# Patient Record
Sex: MALE | NOT HISPANIC OR LATINO | Employment: UNEMPLOYED | URBAN - NONMETROPOLITAN AREA
[De-identification: names, ages, dates, MRNs, and addresses within clinical notes are randomized per-mention and may not be internally consistent; named-entity substitution may affect disease eponyms.]

---

## 2024-03-27 ENCOUNTER — HOSPITAL ENCOUNTER (EMERGENCY)
Facility: HOSPITAL | Age: 44
Discharge: HOME | End: 2024-03-27
Attending: EMERGENCY MEDICINE

## 2024-03-27 VITALS
DIASTOLIC BLOOD PRESSURE: 74 MMHG | HEART RATE: 105 BPM | TEMPERATURE: 98.4 F | SYSTOLIC BLOOD PRESSURE: 138 MMHG | OXYGEN SATURATION: 100 % | RESPIRATION RATE: 18 BRPM

## 2024-03-27 DIAGNOSIS — S61.412A LACERATION OF LEFT HAND WITHOUT FOREIGN BODY, INITIAL ENCOUNTER: Primary | ICD-10-CM

## 2024-03-27 PROCEDURE — 99284 EMERGENCY DEPT VISIT MOD MDM: CPT | Performed by: EMERGENCY MEDICINE

## 2024-03-27 PROCEDURE — 12002 RPR S/N/AX/GEN/TRNK2.6-7.5CM: CPT | Performed by: EMERGENCY MEDICINE

## 2024-03-27 PROCEDURE — 99282 EMERGENCY DEPT VISIT SF MDM: CPT

## 2024-03-27 RX ORDER — CEPHALEXIN 250 MG/1
500 CAPSULE ORAL ONCE
Status: COMPLETED | OUTPATIENT
Start: 2024-03-27 | End: 2024-03-27

## 2024-03-27 RX ORDER — CEPHALEXIN 500 MG/1
500 CAPSULE ORAL EVERY 8 HOURS SCHEDULED
Qty: 15 CAPSULE | Refills: 0 | Status: SHIPPED | OUTPATIENT
Start: 2024-03-27 | End: 2024-04-01

## 2024-03-27 RX ADMIN — CEPHALEXIN 500 MG: 250 CAPSULE ORAL at 15:39

## 2024-03-27 NOTE — ED PROVIDER NOTES
History  Chief Complaint   Patient presents with    Laceration     Patient has laceration on left hand       History provided by:  Medical records and patient   used: Yes (ipad)    Hand Injury  Location:  Hand  Hand location:  L palm  Injury: yes    Time since incident:  20 minutes  Mechanism of injury comment:  Patient was cutting meat with a knife, slipped and cut his left hand  Pain details:     Quality:  Aching and dull    Radiates to:  Does not radiate    Severity:  Mild    Onset quality:  Sudden    Duration: 20 minutes.    Timing:  Constant    Progression:  Unchanged  Handedness:  Left-handed  Foreign body present:  No foreign bodies  Tetanus status:  Up to date  Prior injury to area:  No  Relieved by:  Nothing  Worsened by:  Nothing  Ineffective treatments:  None tried  Associated symptoms: no back pain, no decreased range of motion, no fatigue, no fever, no muscle weakness, no neck pain, no numbness, no stiffness, no swelling and no tingling    Risk factors: no concern for non-accidental trauma        None       History reviewed. No pertinent past medical history.    History reviewed. No pertinent surgical history.    History reviewed. No pertinent family history.  I have reviewed and agree with the history as documented.    E-Cigarette/Vaping    E-Cigarette Use Never User      E-Cigarette/Vaping Substances     Social History     Tobacco Use    Smoking status: Every Day     Types: Cigarettes     Passive exposure: Never    Smokeless tobacco: Never   Vaping Use    Vaping status: Never Used   Substance Use Topics    Alcohol use: Yes    Drug use: Not Currently       Review of Systems   Constitutional:  Negative for appetite change, chills, fatigue and fever.   HENT:  Negative for ear pain, rhinorrhea, sore throat and trouble swallowing.    Eyes:  Negative for pain, discharge and visual disturbance.   Respiratory:  Negative for cough, chest tightness and shortness of breath.    Cardiovascular:   Negative for chest pain and palpitations.   Gastrointestinal:  Negative for abdominal pain, nausea and vomiting.   Endocrine: Negative for polydipsia, polyphagia and polyuria.   Genitourinary:  Negative for difficulty urinating, dysuria, hematuria and testicular pain.   Musculoskeletal:  Negative for arthralgias, back pain, neck pain and stiffness.   Skin:  Positive for wound. Negative for color change and rash.   Allergic/Immunologic: Negative for immunocompromised state.   Neurological:  Negative for dizziness, seizures, syncope, weakness and headaches.   Hematological:  Negative for adenopathy.   Psychiatric/Behavioral:  Negative for confusion and dysphoric mood.    All other systems reviewed and are negative.      Physical Exam  Physical Exam  Vitals and nursing note reviewed.   Constitutional:       General: He is not in acute distress.     Appearance: Normal appearance. He is not ill-appearing, toxic-appearing or diaphoretic.   HENT:      Head: Normocephalic and atraumatic.      Right Ear: External ear normal.      Left Ear: External ear normal.      Nose: Nose normal. No congestion or rhinorrhea.      Mouth/Throat:      Mouth: Mucous membranes are moist.      Pharynx: Oropharynx is clear. No oropharyngeal exudate or posterior oropharyngeal erythema.   Eyes:      General:         Right eye: No discharge.         Left eye: No discharge.   Cardiovascular:      Rate and Rhythm: Normal rate and regular rhythm.      Pulses: Normal pulses.      Heart sounds: Normal heart sounds. No murmur heard.     No gallop.   Pulmonary:      Effort: Pulmonary effort is normal. No respiratory distress.      Breath sounds: Normal breath sounds. No stridor. No wheezing, rhonchi or rales.   Chest:      Chest wall: No tenderness.   Abdominal:      General: Bowel sounds are normal. There is no distension.      Palpations: Abdomen is soft. There is no mass.      Tenderness: There is no abdominal tenderness. There is no right CVA  "tenderness, left CVA tenderness, guarding or rebound.      Hernia: No hernia is present.   Musculoskeletal:         General: Normal range of motion.      Cervical back: Normal range of motion and neck supple.      Comments: The patient has a large laceration approximately 5 cm to the left thenar eminence with exposure of muscle to thenar eminence with small muscle laceration noted, slow bleeding noted.  Patient has full range of motion of his left thumb   Skin:     General: Skin is warm and dry.      Capillary Refill: Capillary refill takes less than 2 seconds.   Neurological:      General: No focal deficit present.      Mental Status: He is alert and oriented to person, place, and time.      Cranial Nerves: No cranial nerve deficit.      Sensory: No sensory deficit.      Motor: No weakness.      Coordination: Coordination normal.      Gait: Gait normal.      Deep Tendon Reflexes: Reflexes normal.   Psychiatric:         Mood and Affect: Mood normal.         Behavior: Behavior normal.         Thought Content: Thought content normal.         Judgment: Judgment normal.         Vital Signs  ED Triage Vitals [03/27/24 1535]   Temperature Pulse Respirations Blood Pressure SpO2   98.4 °F (36.9 °C) 105 18 138/74 100 %      Temp Source Heart Rate Source Patient Position - Orthostatic VS BP Location FiO2 (%)   Temporal Monitor Lying Right arm --      Pain Score       --           Vitals:    03/27/24 1535   BP: 138/74   Pulse: 105   Patient Position - Orthostatic VS: Lying         Visual Acuity      ED Medications  Medications   cephalexin (KEFLEX) capsule 500 mg (500 mg Oral Given 3/27/24 1539)       Diagnostic Studies  Results Reviewed       None                   No orders to display              Procedures  Universal Protocol:  Consent: Verbal consent obtained.  Risks and benefits: risks, benefits and alternatives were discussed  Consent given by: patient  Time out: Immediately prior to procedure a \"time out\" was called to " verify the correct patient, procedure, equipment, support staff and site/side marked as required.  Timeout called at: 3/27/2024 3:45 PM.  Patient identity confirmed: verbally with patient, arm band, provided demographic data and hospital-assigned identification number  Laceration repair    Date/Time: 3/27/2024 3:45 PM    Performed by: Alejandro Gentile MD  Authorized by: Alejandro Gentile MD  Body area: upper extremity  Location details: left hand  Laceration length: 5 cm  Foreign bodies: no foreign bodies  Tendon involvement: none (small muscle laceration noted)  Nerve involvement: none  Vascular damage: no  Anesthesia: local infiltration    Anesthesia:  Local Anesthetic: lidocaine 1% without epinephrine  Anesthetic total: 4 mL    Sedation:  Patient sedated: no        Procedure Details:  Preparation: Patient was prepped and draped in the usual sterile fashion.  Irrigation solution: saline  Irrigation method: syringe  Amount of cleaning: extensive  Debridement: none  Degree of undermining: none  Wound skin closure material used: 2-0 nylon.  Number of sutures: 5  Technique: horizontal mattress  Approximation: close  Approximation difficulty: simple  Dressing: gauze roll  Patient tolerance: patient tolerated the procedure well with no immediate complications               ED Course                                             Medical Decision Making  1529: Patient appears well, vital signs reviewed.  Patient seen a significant laceration to the left thenar eminence.  Patient has full range of motion of the left thumb.  Plan to repair wound primarily, see procedure note for full details..  Patient states his tetanus status is up-to-date.  Due to the degree of injury and muscle involvement, located on the hand, plan to place on antibiotics prophylactically.    Risk  Prescription drug management.             Disposition  Final diagnoses:   Laceration of left hand without foreign body, initial encounter - 5 cm,  complex     Time reflects when diagnosis was documented in both MDM as applicable and the Disposition within this note       Time User Action Codes Description Comment    3/27/2024  4:00 PM Alejandro Gentile Add [S61.412A] Laceration of left hand without foreign body, initial encounter     3/27/2024  4:00 PM Alejandro Gentile Modify [S61.412A] Laceration of left hand without foreign body, initial encounter 5 cm, complex          ED Disposition       ED Disposition   Discharge    Condition   Stable    Date/Time   Wed Mar 27, 2024  4:00 PM    Comment   Mykel Gonzales discharge to home/self care.                   Follow-up Information       Follow up With Specialties Details Why Contact Info    PCP  In 2 weeks For wound re-check, For suture removal             Patient's Medications   Discharge Prescriptions    CEPHALEXIN (KEFLEX) 500 MG CAPSULE    Take 1 capsule (500 mg total) by mouth every 8 (eight) hours for 5 days       Start Date: 3/27/2024 End Date: 4/1/2024       Order Dose: 500 mg       Quantity: 15 capsule    Refills: 0       No discharge procedures on file.    PDMP Review       None            ED Provider  Electronically Signed by             Alejandro Gentile MD  03/27/24 6743

## 2024-03-27 NOTE — Clinical Note
Mykel Gonzales was seen and treated in our emergency department on 3/27/2024.        No work until cleared by Family Doctor/Orthopedics        Diagnosis:     Mykel  may return to work on return date.    He may return on this date: 04/10/2024         If you have any questions or concerns, please don't hesitate to call.      Alejandro Gentile MD    ______________________________           _______________          _______________  Hospital Representative                              Date                                Time

## 2025-06-20 ENCOUNTER — ANESTHESIA EVENT (OUTPATIENT)
Dept: ANESTHESIOLOGY | Facility: HOSPITAL | Age: 45
End: 2025-06-20

## 2025-06-20 ENCOUNTER — APPOINTMENT (EMERGENCY)
Dept: CT IMAGING | Facility: HOSPITAL | Age: 45
End: 2025-06-20
Payer: COMMERCIAL

## 2025-06-20 ENCOUNTER — ANESTHESIA (INPATIENT)
Dept: PERIOP | Facility: HOSPITAL | Age: 45
End: 2025-06-20
Payer: COMMERCIAL

## 2025-06-20 ENCOUNTER — HOSPITAL ENCOUNTER (INPATIENT)
Facility: HOSPITAL | Age: 45
End: 2025-06-20
Attending: EMERGENCY MEDICINE | Admitting: SURGERY
Payer: COMMERCIAL

## 2025-06-20 ENCOUNTER — ANESTHESIA (OUTPATIENT)
Dept: ANESTHESIOLOGY | Facility: HOSPITAL | Age: 45
End: 2025-06-20

## 2025-06-20 ENCOUNTER — ANESTHESIA EVENT (INPATIENT)
Dept: PERIOP | Facility: HOSPITAL | Age: 45
End: 2025-06-20
Payer: COMMERCIAL

## 2025-06-20 DIAGNOSIS — R19.8 PERFORATED VISCUS: Primary | ICD-10-CM

## 2025-06-20 DIAGNOSIS — R19.8 PERFORATION OF VISCUS: ICD-10-CM

## 2025-06-20 PROBLEM — K66.8 PNEUMOPERITONEUM: Status: ACTIVE | Noted: 2025-06-20

## 2025-06-20 LAB
ALBUMIN SERPL BCG-MCNC: 4 G/DL (ref 3.5–5)
ALP SERPL-CCNC: 62 U/L (ref 34–104)
ALT SERPL W P-5'-P-CCNC: 28 U/L (ref 7–52)
ANION GAP SERPL CALCULATED.3IONS-SCNC: 11 MMOL/L (ref 4–13)
AST SERPL W P-5'-P-CCNC: 26 U/L (ref 13–39)
BACTERIA UR QL AUTO: NORMAL /HPF
BASOPHILS # BLD AUTO: 0.14 THOUSANDS/ÂΜL (ref 0–0.1)
BASOPHILS NFR BLD AUTO: 1 % (ref 0–1)
BILIRUB SERPL-MCNC: 1.03 MG/DL (ref 0.2–1)
BILIRUB UR QL STRIP: ABNORMAL
BUN SERPL-MCNC: 13 MG/DL (ref 5–25)
CALCIUM SERPL-MCNC: 9.1 MG/DL (ref 8.4–10.2)
CHLORIDE SERPL-SCNC: 105 MMOL/L (ref 96–108)
CLARITY UR: CLEAR
CO2 SERPL-SCNC: 21 MMOL/L (ref 21–32)
COLOR UR: YELLOW
CREAT SERPL-MCNC: 0.76 MG/DL (ref 0.6–1.3)
EOSINOPHIL # BLD AUTO: 0.06 THOUSAND/ÂΜL (ref 0–0.61)
EOSINOPHIL NFR BLD AUTO: 0 % (ref 0–6)
ERYTHROCYTE [DISTWIDTH] IN BLOOD BY AUTOMATED COUNT: 13 % (ref 11.6–15.1)
GFR SERPL CREATININE-BSD FRML MDRD: 112 ML/MIN/1.73SQ M
GLUCOSE SERPL-MCNC: 122 MG/DL (ref 65–140)
GLUCOSE UR STRIP-MCNC: NEGATIVE MG/DL
HCT VFR BLD AUTO: 55.4 % (ref 36.5–49.3)
HGB BLD-MCNC: 18.1 G/DL (ref 12–17)
HGB UR QL STRIP.AUTO: NEGATIVE
IMM GRANULOCYTES # BLD AUTO: 0.09 THOUSAND/UL (ref 0–0.2)
IMM GRANULOCYTES NFR BLD AUTO: 0 % (ref 0–2)
KETONES UR STRIP-MCNC: NEGATIVE MG/DL
LACTATE SERPL-SCNC: 1.3 MMOL/L (ref 0.5–2)
LEUKOCYTE ESTERASE UR QL STRIP: NEGATIVE
LIPASE SERPL-CCNC: 76 U/L (ref 11–82)
LYMPHOCYTES # BLD AUTO: 1.62 THOUSANDS/ÂΜL (ref 0.6–4.47)
LYMPHOCYTES NFR BLD AUTO: 8 % (ref 14–44)
MCH RBC QN AUTO: 30.5 PG (ref 26.8–34.3)
MCHC RBC AUTO-ENTMCNC: 32.7 G/DL (ref 31.4–37.4)
MCV RBC AUTO: 93 FL (ref 82–98)
MONOCYTES # BLD AUTO: 0.89 THOUSAND/ÂΜL (ref 0.17–1.22)
MONOCYTES NFR BLD AUTO: 4 % (ref 4–12)
NEUTROPHILS # BLD AUTO: 18.93 THOUSANDS/ÂΜL (ref 1.85–7.62)
NEUTS SEG NFR BLD AUTO: 87 % (ref 43–75)
NITRITE UR QL STRIP: NEGATIVE
NON-SQ EPI CELLS URNS QL MICRO: NORMAL /HPF
NRBC BLD AUTO-RTO: 0 /100 WBCS
PH UR STRIP.AUTO: 5.5 [PH]
PLATELET # BLD AUTO: 259 THOUSANDS/UL (ref 149–390)
PLATELET # BLD AUTO: 388 THOUSANDS/UL (ref 149–390)
PMV BLD AUTO: 9.5 FL (ref 8.9–12.7)
PMV BLD AUTO: 9.8 FL (ref 8.9–12.7)
POTASSIUM SERPL-SCNC: 3.8 MMOL/L (ref 3.5–5.3)
PROCALCITONIN SERPL-MCNC: 1.04 NG/ML
PROT SERPL-MCNC: 6.8 G/DL (ref 6.4–8.4)
PROT UR STRIP-MCNC: ABNORMAL MG/DL
RBC # BLD AUTO: 5.93 MILLION/UL (ref 3.88–5.62)
RBC #/AREA URNS AUTO: NORMAL /HPF
SODIUM SERPL-SCNC: 137 MMOL/L (ref 135–147)
SP GR UR STRIP.AUTO: 1.02 (ref 1–1.03)
UROBILINOGEN UR QL STRIP.AUTO: 0.2 E.U./DL
WBC # BLD AUTO: 21.73 THOUSAND/UL (ref 4.31–10.16)
WBC #/AREA URNS AUTO: NORMAL /HPF

## 2025-06-20 PROCEDURE — 80053 COMPREHEN METABOLIC PANEL: CPT | Performed by: EMERGENCY MEDICINE

## 2025-06-20 PROCEDURE — 96365 THER/PROPH/DIAG IV INF INIT: CPT

## 2025-06-20 PROCEDURE — 99284 EMERGENCY DEPT VISIT MOD MDM: CPT

## 2025-06-20 PROCEDURE — 84145 PROCALCITONIN (PCT): CPT | Performed by: EMERGENCY MEDICINE

## 2025-06-20 PROCEDURE — 88341 IMHCHEM/IMCYTCHM EA ADD ANTB: CPT | Performed by: PATHOLOGY

## 2025-06-20 PROCEDURE — 0DQ70ZZ REPAIR STOMACH, PYLORUS, OPEN APPROACH: ICD-10-PCS | Performed by: SURGERY

## 2025-06-20 PROCEDURE — 96375 TX/PRO/DX INJ NEW DRUG ADDON: CPT

## 2025-06-20 PROCEDURE — 99284 EMERGENCY DEPT VISIT MOD MDM: CPT | Performed by: EMERGENCY MEDICINE

## 2025-06-20 PROCEDURE — 96361 HYDRATE IV INFUSION ADD-ON: CPT

## 2025-06-20 PROCEDURE — 87070 CULTURE OTHR SPECIMN AEROBIC: CPT | Performed by: SURGERY

## 2025-06-20 PROCEDURE — 88342 IMHCHEM/IMCYTCHM 1ST ANTB: CPT | Performed by: PATHOLOGY

## 2025-06-20 PROCEDURE — 83605 ASSAY OF LACTIC ACID: CPT | Performed by: EMERGENCY MEDICINE

## 2025-06-20 PROCEDURE — 87205 SMEAR GRAM STAIN: CPT | Performed by: SURGERY

## 2025-06-20 PROCEDURE — 87181 SC STD AGAR DILUTION PER AGT: CPT | Performed by: SURGERY

## 2025-06-20 PROCEDURE — 87077 CULTURE AEROBIC IDENTIFY: CPT | Performed by: SURGERY

## 2025-06-20 PROCEDURE — 74177 CT ABD & PELVIS W/CONTRAST: CPT

## 2025-06-20 PROCEDURE — 87075 CULTR BACTERIA EXCEPT BLOOD: CPT | Performed by: SURGERY

## 2025-06-20 PROCEDURE — 85025 COMPLETE CBC W/AUTO DIFF WBC: CPT | Performed by: EMERGENCY MEDICINE

## 2025-06-20 PROCEDURE — 87040 BLOOD CULTURE FOR BACTERIA: CPT | Performed by: EMERGENCY MEDICINE

## 2025-06-20 PROCEDURE — 81001 URINALYSIS AUTO W/SCOPE: CPT | Performed by: EMERGENCY MEDICINE

## 2025-06-20 PROCEDURE — 83690 ASSAY OF LIPASE: CPT | Performed by: EMERGENCY MEDICINE

## 2025-06-20 PROCEDURE — 88305 TISSUE EXAM BY PATHOLOGIST: CPT | Performed by: PATHOLOGY

## 2025-06-20 PROCEDURE — 85049 AUTOMATED PLATELET COUNT: CPT | Performed by: SURGERY

## 2025-06-20 PROCEDURE — 36415 COLL VENOUS BLD VENIPUNCTURE: CPT | Performed by: EMERGENCY MEDICINE

## 2025-06-20 RX ORDER — PHENYLEPHRINE HCL IN 0.9% NACL 1 MG/10 ML
SYRINGE (ML) INTRAVENOUS AS NEEDED
Status: DISCONTINUED | OUTPATIENT
Start: 2025-06-20 | End: 2025-06-20

## 2025-06-20 RX ORDER — HYDROMORPHONE HCL/PF 1 MG/ML
1 SYRINGE (ML) INJECTION ONCE
Refills: 0 | Status: COMPLETED | OUTPATIENT
Start: 2025-06-20 | End: 2025-06-20

## 2025-06-20 RX ORDER — ACETAMINOPHEN 10 MG/ML
1000 INJECTION, SOLUTION INTRAVENOUS ONCE
Status: COMPLETED | OUTPATIENT
Start: 2025-06-20 | End: 2025-06-20

## 2025-06-20 RX ORDER — DROPERIDOL 2.5 MG/ML
1.25 INJECTION, SOLUTION INTRAMUSCULAR; INTRAVENOUS ONCE
Status: COMPLETED | OUTPATIENT
Start: 2025-06-20 | End: 2025-06-20

## 2025-06-20 RX ORDER — BUPIVACAINE HYDROCHLORIDE 2.5 MG/ML
INJECTION, SOLUTION EPIDURAL; INFILTRATION; INTRACAUDAL; PERINEURAL
Status: COMPLETED | OUTPATIENT
Start: 2025-06-20 | End: 2025-06-20

## 2025-06-20 RX ORDER — ROCURONIUM BROMIDE 10 MG/ML
INJECTION, SOLUTION INTRAVENOUS AS NEEDED
Status: DISCONTINUED | OUTPATIENT
Start: 2025-06-20 | End: 2025-06-20

## 2025-06-20 RX ORDER — PROPOFOL 10 MG/ML
INJECTION, EMULSION INTRAVENOUS AS NEEDED
Status: DISCONTINUED | OUTPATIENT
Start: 2025-06-20 | End: 2025-06-20

## 2025-06-20 RX ORDER — HYDROMORPHONE HCL/PF 1 MG/ML
0.5 SYRINGE (ML) INJECTION
Status: DISCONTINUED | OUTPATIENT
Start: 2025-06-20 | End: 2025-06-20 | Stop reason: HOSPADM

## 2025-06-20 RX ORDER — CEFAZOLIN SODIUM 2 G/50ML
2000 SOLUTION INTRAVENOUS
Status: COMPLETED | OUTPATIENT
Start: 2025-06-20 | End: 2025-06-20

## 2025-06-20 RX ORDER — PANTOPRAZOLE SODIUM 40 MG/10ML
40 INJECTION, POWDER, LYOPHILIZED, FOR SOLUTION INTRAVENOUS EVERY 12 HOURS SCHEDULED
Status: DISCONTINUED | OUTPATIENT
Start: 2025-06-20 | End: 2025-06-27 | Stop reason: HOSPADM

## 2025-06-20 RX ORDER — HYDROMORPHONE HCL/PF 1 MG/ML
0.5 SYRINGE (ML) INJECTION
Refills: 0 | Status: DISCONTINUED | OUTPATIENT
Start: 2025-06-20 | End: 2025-06-27 | Stop reason: HOSPADM

## 2025-06-20 RX ORDER — PROMETHAZINE HYDROCHLORIDE 25 MG/ML
12.5 INJECTION, SOLUTION INTRAMUSCULAR; INTRAVENOUS ONCE
Status: DISCONTINUED | OUTPATIENT
Start: 2025-06-20 | End: 2025-06-20

## 2025-06-20 RX ORDER — ONDANSETRON 2 MG/ML
INJECTION INTRAMUSCULAR; INTRAVENOUS AS NEEDED
Status: DISCONTINUED | OUTPATIENT
Start: 2025-06-20 | End: 2025-06-20

## 2025-06-20 RX ORDER — ACETAMINOPHEN 10 MG/ML
1000 INJECTION, SOLUTION INTRAVENOUS EVERY 6 HOURS PRN
Status: DISCONTINUED | OUTPATIENT
Start: 2025-06-20 | End: 2025-06-27 | Stop reason: HOSPADM

## 2025-06-20 RX ORDER — HYDROMORPHONE HCL/PF 1 MG/ML
0.2 SYRINGE (ML) INJECTION EVERY 4 HOURS PRN
Status: DISCONTINUED | OUTPATIENT
Start: 2025-06-20 | End: 2025-06-27 | Stop reason: HOSPADM

## 2025-06-20 RX ORDER — MIDAZOLAM HYDROCHLORIDE 2 MG/2ML
INJECTION, SOLUTION INTRAMUSCULAR; INTRAVENOUS AS NEEDED
Status: DISCONTINUED | OUTPATIENT
Start: 2025-06-20 | End: 2025-06-20

## 2025-06-20 RX ORDER — DEXAMETHASONE SODIUM PHOSPHATE 10 MG/ML
INJECTION, SOLUTION INTRAMUSCULAR; INTRAVENOUS AS NEEDED
Status: DISCONTINUED | OUTPATIENT
Start: 2025-06-20 | End: 2025-06-20

## 2025-06-20 RX ORDER — SODIUM CHLORIDE, SODIUM LACTATE, POTASSIUM CHLORIDE, CALCIUM CHLORIDE 600; 310; 30; 20 MG/100ML; MG/100ML; MG/100ML; MG/100ML
INJECTION, SOLUTION INTRAVENOUS CONTINUOUS PRN
Status: DISCONTINUED | OUTPATIENT
Start: 2025-06-20 | End: 2025-06-20

## 2025-06-20 RX ORDER — CEFTRIAXONE 2 G/50ML
2000 INJECTION, SOLUTION INTRAVENOUS EVERY 24 HOURS
Status: DISCONTINUED | OUTPATIENT
Start: 2025-06-20 | End: 2025-06-27 | Stop reason: HOSPADM

## 2025-06-20 RX ORDER — ONDANSETRON 2 MG/ML
4 INJECTION INTRAMUSCULAR; INTRAVENOUS EVERY 6 HOURS PRN
Status: DISCONTINUED | OUTPATIENT
Start: 2025-06-20 | End: 2025-06-27 | Stop reason: HOSPADM

## 2025-06-20 RX ORDER — NICOTINE 21 MG/24HR
1 PATCH, TRANSDERMAL 24 HOURS TRANSDERMAL DAILY
Status: DISCONTINUED | OUTPATIENT
Start: 2025-06-20 | End: 2025-06-27 | Stop reason: HOSPADM

## 2025-06-20 RX ORDER — SUCCINYLCHOLINE/SOD CL,ISO/PF 100 MG/5ML
SYRINGE (ML) INTRAVENOUS AS NEEDED
Status: DISCONTINUED | OUTPATIENT
Start: 2025-06-20 | End: 2025-06-20

## 2025-06-20 RX ORDER — PROMETHAZINE HYDROCHLORIDE 25 MG/ML
12.5 INJECTION, SOLUTION INTRAMUSCULAR; INTRAVENOUS ONCE
Status: COMPLETED | OUTPATIENT
Start: 2025-06-20 | End: 2025-06-20

## 2025-06-20 RX ORDER — METRONIDAZOLE 500 MG/100ML
500 INJECTION, SOLUTION INTRAVENOUS EVERY 8 HOURS
Status: DISCONTINUED | OUTPATIENT
Start: 2025-06-20 | End: 2025-06-27 | Stop reason: HOSPADM

## 2025-06-20 RX ORDER — HEPARIN SODIUM 5000 [USP'U]/ML
5000 INJECTION, SOLUTION INTRAVENOUS; SUBCUTANEOUS EVERY 8 HOURS SCHEDULED
Status: DISCONTINUED | OUTPATIENT
Start: 2025-06-21 | End: 2025-06-27 | Stop reason: HOSPADM

## 2025-06-20 RX ORDER — SODIUM CHLORIDE 9 MG/ML
50 INJECTION, SOLUTION INTRAVENOUS CONTINUOUS
Status: DISCONTINUED | OUTPATIENT
Start: 2025-06-20 | End: 2025-06-27 | Stop reason: HOSPADM

## 2025-06-20 RX ORDER — MAGNESIUM HYDROXIDE 1200 MG/15ML
LIQUID ORAL AS NEEDED
Status: DISCONTINUED | OUTPATIENT
Start: 2025-06-20 | End: 2025-06-20 | Stop reason: HOSPADM

## 2025-06-20 RX ORDER — FENTANYL CITRATE 50 UG/ML
INJECTION, SOLUTION INTRAMUSCULAR; INTRAVENOUS AS NEEDED
Status: DISCONTINUED | OUTPATIENT
Start: 2025-06-20 | End: 2025-06-20

## 2025-06-20 RX ADMIN — PANTOPRAZOLE SODIUM 40 MG: 40 INJECTION, POWDER, FOR SOLUTION INTRAVENOUS at 21:59

## 2025-06-20 RX ADMIN — HYDROMORPHONE HYDROCHLORIDE 1 MG: 1 INJECTION, SOLUTION INTRAMUSCULAR; INTRAVENOUS; SUBCUTANEOUS at 09:37

## 2025-06-20 RX ADMIN — PIPERACILLIN AND TAZOBACTAM 4.5 G: 36; 4.5 INJECTION, POWDER, FOR SOLUTION INTRAVENOUS at 10:27

## 2025-06-20 RX ADMIN — ROCURONIUM BROMIDE 20 MG: 10 INJECTION INTRAVENOUS at 13:14

## 2025-06-20 RX ADMIN — Medication 100 MG: at 12:21

## 2025-06-20 RX ADMIN — PROMETHAZINE HYDROCHLORIDE 12.5 MG: 25 INJECTION INTRAMUSCULAR; INTRAVENOUS at 15:58

## 2025-06-20 RX ADMIN — PROPOFOL 160 MG: 10 INJECTION, EMULSION INTRAVENOUS at 12:21

## 2025-06-20 RX ADMIN — DEXAMETHASONE SODIUM PHOSPHATE 10 MG: 10 INJECTION, SOLUTION INTRAMUSCULAR; INTRAVENOUS at 12:24

## 2025-06-20 RX ADMIN — CEFTRIAXONE 2000 MG: 2 INJECTION, SOLUTION INTRAVENOUS at 17:23

## 2025-06-20 RX ADMIN — ROCURONIUM BROMIDE 50 MG: 10 INJECTION INTRAVENOUS at 12:21

## 2025-06-20 RX ADMIN — SODIUM CHLORIDE 1000 ML: 0.9 INJECTION, SOLUTION INTRAVENOUS at 09:22

## 2025-06-20 RX ADMIN — FENTANYL CITRATE 50 MCG: 50 INJECTION INTRAMUSCULAR; INTRAVENOUS at 13:02

## 2025-06-20 RX ADMIN — DROPERIDOL 1.25 MG: 2.5 INJECTION, SOLUTION INTRAMUSCULAR; INTRAVENOUS at 09:25

## 2025-06-20 RX ADMIN — NICOTINE 1 PATCH: 14 PATCH, EXTENDED RELEASE TRANSDERMAL at 17:22

## 2025-06-20 RX ADMIN — IOHEXOL 75 ML: 350 INJECTION, SOLUTION INTRAVENOUS at 09:44

## 2025-06-20 RX ADMIN — ONDANSETRON 4 MG: 2 INJECTION INTRAMUSCULAR; INTRAVENOUS at 13:52

## 2025-06-20 RX ADMIN — SUGAMMADEX 200 MG: 100 INJECTION, SOLUTION INTRAVENOUS at 14:24

## 2025-06-20 RX ADMIN — MORPHINE SULFATE 2 MG: 2 INJECTION, SOLUTION INTRAMUSCULAR; INTRAVENOUS at 09:24

## 2025-06-20 RX ADMIN — FENTANYL CITRATE 50 MCG: 50 INJECTION INTRAMUSCULAR; INTRAVENOUS at 12:21

## 2025-06-20 RX ADMIN — SODIUM CHLORIDE, SODIUM LACTATE, POTASSIUM CHLORIDE, AND CALCIUM CHLORIDE: .6; .31; .03; .02 INJECTION, SOLUTION INTRAVENOUS at 13:27

## 2025-06-20 RX ADMIN — ACETAMINOPHEN 1000 MG: 10 INJECTION, SOLUTION INTRAVENOUS at 13:55

## 2025-06-20 RX ADMIN — BUPIVACAINE HYDROCHLORIDE 60 ML: 2.5 INJECTION, SOLUTION EPIDURAL; INFILTRATION; INTRACAUDAL at 14:27

## 2025-06-20 RX ADMIN — METRONIDAZOLE 500 MG: 500 INJECTION, SOLUTION INTRAVENOUS at 18:23

## 2025-06-20 RX ADMIN — MIDAZOLAM 2 MG: 1 INJECTION INTRAMUSCULAR; INTRAVENOUS at 12:16

## 2025-06-20 RX ADMIN — SODIUM CHLORIDE, SODIUM LACTATE, POTASSIUM CHLORIDE, AND CALCIUM CHLORIDE: .6; .31; .03; .02 INJECTION, SOLUTION INTRAVENOUS at 12:16

## 2025-06-20 RX ADMIN — CEFAZOLIN SODIUM 2000 MG: 2 SOLUTION INTRAVENOUS at 12:16

## 2025-06-20 RX ADMIN — SODIUM CHLORIDE 125 ML/HR: 0.9 INJECTION, SOLUTION INTRAVENOUS at 16:44

## 2025-06-20 RX ADMIN — Medication 100 MCG: at 13:46

## 2025-06-20 NOTE — H&P
H&P -General Surgery  Mykel Gonzales 44 y.o. male MRN: [id number]  Unit/Bed#: ED 09 Encounter: [id number]            ASSESSMENT: 43yo M with no significant PMHx presenting with acute abdominal pain and vomiting x 1 day and CT findings of pneumoperitoneum in the upper abdomen concerning for perforated abdominal viscus.    CTAP with IV contrast 6/20/2025 findings:  ABDOMINOPELVIC CAVITY: Ascites seen with fluid in the pelvic cul-de-sac, paracolic gutters. There is pneumoperitoneum with air in the upper abdomen, subdiaphragmatic region. This is also seen near the royer hepatis. Mild thickening of the proximal sigmoid colon  IMPRESSION: Hollow viscus perforation with pneumoperitoneum and ascites  Although the exact site of perforation is difficult to discern, the free air is predominantly distributed in the upper abdomen generally favors perforation in the gastroduodenal area  Mild thickening of the sigmoid colon  Patent mesenteric vasculature    VSS and WNL  WBC 21.7  Hemoglobin 18.1  Lipase WNL  Pro-Nakul 1.04    PMHx: 1 pack/day smoker x 20 years  PSHx: None    Plan:  Patient will be taken to the operating room ASAP for exploratory laparotomy  Ancef on call to the OR   IVF  Analgesics/antiemetics  N.p.o.    HPI: Mykel Gonzales is a 44 y.o. year old Thai-speaking male with no significant PMHx who presents to the ED sudden onset severe generalized abdominal pain since this morning.  He has associated multiple episodes of vomiting.  He was eating and drinking without difficulty throughout yesterday.  Denies any diarrhea, constipation, hematochezia, melena, history of GI problems, history of similar pain in the past.  Denies any fever, urinary complaints, dizziness ,syncope, SOB or CP.  He has had no surgery in the past.  While in the ED, he was found on exam to have a rigid abdomen with diffuse tenderness and guarding.  Vital signs were stable and within normal limits.  Lab studies showed leukocytosis and were  "otherwise unremarkable.  CT of his abdomen showed ascites and pneumoperitoneum in the upper abdomen, subdiaphragmatic region and near the royer hepatis concerning for perforated abdominal viscus in the gastroduodenal area.       Review of Systems   Constitutional:  Negative for chills and fever.   HENT:  Negative for ear pain, rhinorrhea and sore throat.    Eyes:  Negative for pain and visual disturbance.   Respiratory:  Negative for cough, shortness of breath and wheezing.    Cardiovascular:  Negative for chest pain and palpitations.   Gastrointestinal:  Positive for abdominal pain, nausea and vomiting. Negative for abdominal distention, blood in stool, constipation and diarrhea.   Genitourinary:  Negative for difficulty urinating, dysuria and hematuria.   Musculoskeletal:  Negative for arthralgias and back pain.   Skin:  Negative for color change and rash.   Neurological:  Negative for dizziness, seizures, syncope and weakness.   Psychiatric/Behavioral:  Negative for agitation and confusion.    All other systems reviewed and are negative.      Historical Information   Past Medical History[1]  Past Surgical History[2]  Social History   Social History     Substance and Sexual Activity   Alcohol Use Yes    Comment: daily beer drinker     Social History     Substance and Sexual Activity   Drug Use Not Currently     Tobacco Use History[3]  Family History[4]    Meds/Allergies     Not in a hospital admission.    Current Facility-Administered Medications:     piperacillin-tazobactam (ZOSYN) IVPB 4.5 g, Once    Allergies[5]    Objective     Blood pressure 131/87, pulse 90, temperature 98 °F (36.7 °C), temperature source Temporal, resp. rate 18, height 5' 8\" (1.727 m), weight 69.8 kg (153 lb 14.1 oz), SpO2 97%.    Intake/Output Summary (Last 24 hours) at 6/20/2025 1041  Last data filed at 6/20/2025 1028  Gross per 24 hour   Intake 1000 ml   Output --   Net 1000 ml       PHYSICAL EXAM  Physical Exam  Vitals and nursing note " reviewed.   Constitutional:       General: He is not in acute distress.     Appearance: Normal appearance. He is well-developed and normal weight.   HENT:      Head: Normocephalic and atraumatic.      Nose: Nose normal.      Mouth/Throat:      Mouth: Mucous membranes are moist.     Eyes:      Conjunctiva/sclera: Conjunctivae normal.      Pupils: Pupils are equal, round, and reactive to light.       Cardiovascular:      Rate and Rhythm: Normal rate and regular rhythm.      Heart sounds: Normal heart sounds. No murmur heard.  Pulmonary:      Effort: Pulmonary effort is normal. No respiratory distress.      Breath sounds: Normal breath sounds. No wheezing or rales.   Abdominal:      General: Abdomen is flat. There is no distension.      Palpations: There is no mass.      Tenderness: There is abdominal tenderness. There is guarding and rebound.      Hernia: No hernia is present.      Comments: Diffuse abdominal tenderness to light palpation and percussion with rigidity and guarding.      Musculoskeletal:         General: No swelling or tenderness.      Cervical back: Neck supple.      Right lower leg: No edema.      Left lower leg: No edema.     Skin:     General: Skin is warm and dry.      Capillary Refill: Capillary refill takes less than 2 seconds.     Neurological:      General: No focal deficit present.      Mental Status: He is alert and oriented to person, place, and time.     Psychiatric:         Mood and Affect: Mood normal.         Behavior: Behavior normal.       Lab Results: I have personally reviewed pertinent lab results.    Imaging: Results Review Statement: I reviewed radiology reports from this admission including: CT abdomen/pelvis.  EKG, Pathology, and Other Studies:     Counseling / Coordination of Care  Total time spent today  30 minutes. Greater than 50% of total time was spent with the patient and / or family counseling and / or coordination of care and/or review of patient's pmhx history, labs,  imaging.  Obtaining HPI, performing exam, discussing treatment plan with patient.     Maddison Rangel PA-C  6/20/2025 10:41 AM          [1] No past medical history on file.  [2] No past surgical history on file.  [3]   Social History  Tobacco Use   Smoking Status Every Day    Types: Cigarettes    Passive exposure: Never   Smokeless Tobacco Never   [4] No family history on file.  [5] No Known Allergies

## 2025-06-20 NOTE — PLAN OF CARE
Problem: Nutrition/Hydration-ADULT  Goal: Nutrient/Hydration intake appropriate for improving, restoring or maintaining nutritional needs  Description: Monitor and assess patient's nutrition/hydration status for malnutrition. Collaborate with interdisciplinary team and initiate plan and interventions as ordered.  Monitor patient's weight and dietary intake as ordered or per policy. Utilize nutrition screening tool and intervene as necessary. Determine patient's food preferences and provide high-protein, high-caloric foods as appropriate.     INTERVENTIONS:  - Monitor oral intake, urinary output, labs, and treatment plans  - Assess nutrition and hydration status and recommend course of action  - Evaluate amount of meals eaten  - Assist patient with eating if necessary   - Allow adequate time for meals  - Recommend/ encourage appropriate diets, oral nutritional supplements, and vitamin/mineral supplements  - Order, calculate, and assess calorie counts as needed  - Recommend, monitor, and adjust tube feedings and TPN/PPN based on assessed needs  - Assess need for intravenous fluids  - Provide specific nutrition/hydration education as appropriate  - Include patient/family/caregiver in decisions related to nutrition  Outcome: Progressing     Problem: GASTROINTESTINAL - ADULT  Goal: Minimal or absence of nausea and/or vomiting  Description: INTERVENTIONS:  - Administer IV fluids if ordered to ensure adequate hydration  - Maintain NPO status until nausea and vomiting are resolved  - Nasogastric tube if ordered  - Administer ordered antiemetic medications as needed  - Provide nonpharmacologic comfort measures as appropriate  - Advance diet as tolerated, if ordered  - Consider nutrition services referral to assist patient with adequate nutrition and appropriate food choices  Outcome: Progressing

## 2025-06-20 NOTE — ANESTHESIA PREPROCEDURE EVALUATION
Procedure:  PRE-OP ONLY    Relevant Problems   No relevant active problems      Acute abdomen      Physical Exam    Airway     Mallampati score: II  TM Distance: >3 FB  Neck ROM: full  Mouth opening: >= 4 cm      Cardiovascular  Cardiovascular exam normal    Dental   No notable dental hx     Pulmonary  Pulmonary exam normal     Neurological  - normal exam    Other Findings        Anesthesia Plan  ASA Score- 1 Emergent    Anesthesia Type- general with ASA Monitors.         Additional Monitors:     Airway Plan: Oral ETT.           Plan Factors-Exercise tolerance (METS): >4 METS.    Chart reviewed.  Imaging results reviewed. Existing labs reviewed. Patient summary reviewed.    Patient is not a current smoker.  Patient did not smoke on day of surgery.    Obstructive sleep apnea risk education given perioperatively.        Induction- intravenous.    Postoperative Plan- Plan for postoperative opioid use.   Monitoring Plan - Monitoring plan - standard ASA monitoring  Post Operative Pain Plan - plan for postoperative opioid use and peripheral nerve block    Perioperative Resuscitation Plan - Level 1 - Full Code.       Informed Consent-       NPO Status:  No vitals data found for the desired time range.

## 2025-06-20 NOTE — ANESTHESIA POSTPROCEDURE EVALUATION
Post-Op Assessment Note    CV Status:  Stable    Pain management: adequate    Multimodal analgesia used between 6 hours prior to anesthesia start to PACU discharge    Mental Status:  Sleepy   Hydration Status:  Euvolemic   PONV Controlled:  Controlled   Airway Patency:  Patent     Post Op Vitals Reviewed: Yes    No anethesia notable event occurred.    Staff: CRNA           Last Filed PACU Vitals:  Vitals Value Taken Time   Temp 98.2    Pulse 106    /93    Resp 20    SpO2 100

## 2025-06-20 NOTE — ANESTHESIA PREPROCEDURE EVALUATION
Procedure:  LAPAROTOMY EXPLORATORY (Abdomen)  RESECTION COLON LEFT (Abdomen)  RESECTION COLON RIGHT (Abdomen)    Relevant Problems   No relevant active problems      Acute abdomen      Physical Exam    Airway     Mallampati score: II  TM Distance: >3 FB  Neck ROM: full  Mouth opening: >= 4 cm      Cardiovascular  Cardiovascular exam normal    Dental   No notable dental hx     Pulmonary  Pulmonary exam normal     Neurological  - normal exam    Other Findings        Anesthesia Plan  ASA Score- 1 Emergent    Anesthesia Type- general with ASA Monitors.         Additional Monitors:     Airway Plan: Oral ETT.    Comment: TAP discussed.       Plan Factors-Exercise tolerance (METS): >4 METS.    Chart reviewed.  Imaging results reviewed. Existing labs reviewed. Patient summary reviewed.    Patient is not a current smoker.  Patient did not smoke on day of surgery.    Obstructive sleep apnea risk education given perioperatively.        Induction- intravenous.    Postoperative Plan- Plan for postoperative opioid use.   Monitoring Plan - Monitoring plan - standard ASA monitoring  Post Operative Pain Plan - plan for postoperative opioid use and peripheral nerve block    Perioperative Resuscitation Plan - Level 1 - Full Code.       Informed Consent- Anesthetic plan and risks discussed with patient.  I personally reviewed this patient with the CRNA. Discussed and agreed on the Anesthesia Plan with the CRNA..      NPO Status:  No vitals data found for the desired time range.

## 2025-06-20 NOTE — OP NOTE
OPERATIVE REPORT  PATIENT NAME: Mykel Gonzales    :  1980  MRN: [id number]  Pt Location: [facility] OR ROOM 01    SURGERY DATE: 2025    Surgeons and Role:     * Janie Tate,  - Primary     * Maddison Rangel PA-C - Assisting    Preop Diagnosis:  Perforated viscus [R19.8]    Postop diagnosis:  Pneumoperitoneum secondary to perforated gastric ulcer at pylorus with peritonitis     Procedure(s):  Exploratory laparotomy. Omar patch repair of perforated ulcer of pylorus    Specimen(s):  ID Type Source Tests Collected by Time Destination   1 : Bx of perforated pylorus Tissue Stomach TISSUE EXAM Janie Tate,  2025 1306    A : Peritoneal fluid Body Fluid Peritoneal Fluid ANAEROBIC CULTURE AND GRAM STAIN, BODY FLUID CULTURE AND GRAM STAIN Janie Tate, DO 2025 1242        Estimated Blood Loss:   Minimal    Drains:  Closed/Suction Drain RLQ Bulb 19 Fr. (Active)   Site Description Unable to view 25 145   Dressing Status Intact;Clean;Dry 25 145   Drainage Appearance Bloody;Serosanguineous 25 145   Status To bulb suction 25 1452   Output (mL) 50 mL 25 1437   Number of days: 0       NG/OG/Enteral Tube Nasogastric 18 Fr Right nare (Active)   Site Assessment Clean;Dry;Intact 25 1452   Securement Assessment Adhesive Securement 25 145   Marking at Nare (cm) - For ongoing assessment of tube placement 58 cm 25 145   Status Suction-low continuous 25 1452   Drainage Appearance Green 25 1452   Number of days: 0       Urethral Catheter Non-latex 16 Fr. (Active)   Site Assessment Clean;Skin intact 25 145   Collection Container Standard drainage bag 25 145   Securement Method Other (Comment) 25 145   Output (mL) 30 mL 25 1452   Number of days: 0       [REMOVED] NG/OG/Enteral Tube Orogastric 18 Fr Left mouth (Removed)   Number of days: 0       Anesthesia Type:   General    Operative Indications:  Perforated  viscus [R19.8]      Operative Findings:  The gallbladder was adhered to the stomach at the site of the perforation by a small adhesion.  Once this adhesion was lysed, the perforation was identified  at the gastric pylorus.  It measured approximately 1 cm.  There was significant inflammation of the area consistent with probable ulcer.  Biopsy was obtained.  There was gastric content contamination of all 4 quadrants of the abdominal cavity.  Infection was present at time of surgery as evidenced by contamination from gastric contents.       Complications:   None    Procedure and Technique:  After obtaining informed consent, the patient was brought in the operating room and placed in the supine position.  SCDs were placed and he was placed under general anesthesia.  Man catheter was then sterilely placed and he was prepped and draped in usual sterile fashion.  A timeout was then performed.  Appropriate antibiotics have been administered.  The colon protocol was adhered to throughout the case.  Using a #10 blade, a midline incision was fashioned from approximately 2 fingerbreadths below the xiphoid to just above the umbilicus.  The incision was extended through the skin and subcutaneous tissue to the fascia.  The fascia was then incised along the course of the incision using cautery.  The peritoneum was then grasped and entered carefully along the course of the incision.  Immediately gastric bilious contents were noted.  Cultures were immediately taken and the fluid was aspirated.  Upon inspection the gallbladder was adhered to the area near the pylorus with an adhesion.  Careful dissection was utilized to free up this adhesion.  The gallbladder was intact.  There was a 1 cm perforation of the pylorus.  A small portion of the area was excised for biopsy.  3-0 Vicryl interrupted sutures were used to try to approximate the defect, but the tissue was very inflamed and did not approximate the tissue well.  A pedicle of  omentum was developed using the CovidiTactoTek Impact LigaSure.  Interrupted 2-0 silk sutures were placed from approximately 2 cm superior to the defect to approximately 2 cm inferior to the defect in a through and through fashion.  The omental pedicle was then placed over the perforation and the silk sutures were tied.  Anesthesia then placed an NG tube while palpating the stomach.  Methylene blue was then injected into the stomach and a lap pad was placed near the omental patch.  There was no blue dye extravasating out of the repair onto the lap pad.  All sponge, needle, and instrument counts were correct.  All 4 quadrants of the abdomen were copiously irrigated with saline solution until clear.  A 19 [place] ROBYN drain was then brought through a right upper lateral abdominal stab wound and placed near the repair.  It was secured with a 2-0 silk suture.  All gloves and gowns were changed by all participants.  A side tray of sterile instruments were then brought into the field.  Sterile towels were placed around the incision.  The fascia was then closed with a running looped 1 PDS suture.  Subcutaneous tissue was copiously irrigated with saline solution.  Subcutaneous tissue was then loosely reapproximated with a running 3-0 plain suture.  The skin was closed with skin clips.  The incision was cleansed well and sterile dressings were applied.  The NG tube was secured into place by anesthesia.  Anesthesia then placed a tap block.  Please refer to their note.  The patient was extubated in the operating room without difficulty.  The patient tolerated the procedure well was transferred to recovery in stable and satisfactory condition.   I was present for the entire procedure.    Patient Disposition:  PACU     This procedure was not performed to treat colon cancer through resection       SIGNATURE: Janie Tate DO  DATE: June 20, 2025  TIME: 3:07 PM

## 2025-06-20 NOTE — ED PROVIDER NOTES
Time reflects when diagnosis was documented in both MDM as applicable and the Disposition within this note       Time User Action Codes Description Comment    6/20/2025 10:43 AM Janie Tate Add [R19.8] Perforated viscus     6/20/2025 10:52 AM Mariel Gomez Add [R19.8] Perforation of viscus     6/20/2025 12:45 PM NicholsonRigoberto manntlin Modify [R19.8] Perforated viscus           ED Disposition       ED Disposition   Admit    Condition   Stable    Date/Time   Fri Jun 20, 2025 10:42 AM    Comment                  Assessment & Plan       Medical Decision Making  Differential diagnosis includes but not limited to: Small bowel obstruction, diverticulitis,  ileus bowel perforation, ischemic bowel    Amount and/or Complexity of Data Reviewed  Labs: ordered.  Radiology: ordered.    Risk  Prescription drug management.  Decision regarding hospitalization.        ED Course as of 06/20/25 1519   Fri Jun 20, 2025   1013 Dr. Tate is at bedside to see the patient       Medications   sodium chloride 0.9 % bolus 1,000 mL (0 mL Intravenous Stopped 6/20/25 1028)   droperidol (INAPSINE) injection 1.25 mg (1.25 mg Intravenous Given 6/20/25 0925)   morphine injection 2 mg (2 mg Intravenous Given 6/20/25 0924)   HYDROmorphone (DILAUDID) injection 1 mg (1 mg Intravenous Given 6/20/25 0937)   iohexol (OMNIPAQUE) 350 MG/ML injection (MULTI-DOSE) 75 mL (75 mL Intravenous Given 6/20/25 0944)   piperacillin-tazobactam (ZOSYN) IVPB 4.5 g (0 g Intravenous Stopped 6/20/25 1108)       ED Risk Strat Scores                    No data recorded        SBIRT 22yo+      Flowsheet Row Most Recent Value   Initial Alcohol Screen: US AUDIT-C     1. How often do you have a drink containing alcohol? 0 Filed at: 06/20/2025 0911   2. How many drinks containing alcohol do you have on a typical day you are drinking?  0 Filed at: 06/20/2025 0911   3a. Male UNDER 65: How often do you have five or more drinks on one occasion? 0 Filed at: 06/20/2025 0911   Audit-C  Score 0 Filed at: 06/20/2025 0911   MEHUL: How many times in the past year have you...    Used an illegal drug or used a prescription medication for non-medical reasons? Never Filed at: 06/20/2025 0911                            History of Present Illness       Chief Complaint   Patient presents with    Abdominal Pain     Pt reports waking with diffuse abdominal pain and vomiting        Past Medical History[1]   Past Surgical History[2]   Family History[3]   Social History[4]   E-Cigarette/Vaping    E-Cigarette Use Never User       E-Cigarette/Vaping Substances      I have reviewed and agree with the history as documented.     This is a 44-year-old male presenting to the ED for evaluation of diffuse abdominal pain and vomiting that began this morning.  Patient states that his pain woke him up from sleep and he has not had any fever chills or any diarrhea.  He has had multiple episodes of nonbloody emesis.  He states that his pain is sharp 10 out of 10 nonradiating and constant.  He denies any testicular or urinary symptoms.        Review of Systems   Constitutional:  Negative for chills and fever.   HENT:  Negative for ear pain and sore throat.    Eyes:  Negative for pain and visual disturbance.   Respiratory:  Negative for cough and shortness of breath.    Cardiovascular:  Negative for chest pain and palpitations.   Gastrointestinal:  Positive for abdominal pain and nausea. Negative for vomiting.   Genitourinary:  Negative for dysuria and hematuria.   Musculoskeletal:  Negative for arthralgias and back pain.   Skin:  Negative for color change and rash.   Neurological:  Negative for seizures and syncope.   All other systems reviewed and are negative.          Objective       ED Triage Vitals [06/20/25 0911]   Temperature Pulse Blood Pressure Respirations SpO2 Patient Position - Orthostatic VS   98 °F (36.7 °C) 90 119/73 18 99 % --      Temp Source Heart Rate Source BP Location FiO2 (%) Pain Score    Temporal Monitor  -- -- 10 - Worst Possible Pain      Vitals      Date and Time Temp Pulse SpO2 Resp BP Pain Score FACES Pain Rating User   06/20/25 1507 97.8 °F (36.6 °C) 98 96 % 14 134/97 No Pain -- ER   06/20/25 1452 97.4 °F (36.3 °C) 101 100 % 15 134/92 -- -- ER   06/20/25 1437 98.1 °F (36.7 °C) 107 100 % 14 142/93 -- -- ER   06/20/25 1140 97 °F (36.1 °C) 94 95 % 20 120/82 6 -- [place]   06/20/25 1123 -- -- -- -- -- 6 -- [place]   06/20/25 1100 -- 90 96 % 18 122/85 -- --    06/20/25 1045 -- 94 97 % -- 137/91 -- --    06/20/25 1030 -- 91 96 % -- 122/88 -- --    06/20/25 1015 -- 90 97 % -- 131/87 -- --    06/20/25 1000 -- 89 97 % 18 129/84 -- --    06/20/25 0937 -- -- -- -- -- 10 - Worst Possible Pain --    06/20/25 0924 -- -- -- -- -- 10 - Worst Possible Pain --    06/20/25 0911 98 °F (36.7 °C) 90 99 % 18 119/73 10 - Worst Possible Pain -- SA            Physical Exam  Vitals and nursing note reviewed.   Constitutional:       General: He is in acute distress.      Appearance: He is well-developed. He is ill-appearing and toxic-appearing.   HENT:      Head: Normocephalic and atraumatic.      Mouth/Throat:      Mouth: Mucous membranes are moist.     Eyes:      Extraocular Movements: Extraocular movements intact.      Conjunctiva/sclera: Conjunctivae normal.       Cardiovascular:      Rate and Rhythm: Normal rate and regular rhythm.      Heart sounds: No murmur heard.  Pulmonary:      Effort: Pulmonary effort is normal. No respiratory distress.      Breath sounds: Normal breath sounds.   Abdominal:      General: Abdomen is scaphoid. Bowel sounds are decreased. There is distension.      Palpations: Abdomen is rigid.      Tenderness: There is generalized abdominal tenderness. There is guarding.      Hernia: No hernia is present.     Musculoskeletal:         General: No swelling.      Cervical back: Neck supple.     Skin:     General: Skin is warm and dry.      Capillary Refill: Capillary refill takes less than 2 seconds.       Coloration: Skin is not cyanotic, jaundiced, mottled or pale.     Neurological:      General: No focal deficit present.      Mental Status: He is alert and oriented to person, place, and time.     Psychiatric:         Mood and Affect: Mood normal.         Behavior: Behavior normal.         Results Reviewed       Procedure Component Value Units Date/Time    Lactic acid, plasma (w/reflex if result > 2.0) [[id number]]  (Normal) Collected: 06/20/25 1016    Lab Status: Final result Specimen: Blood from Arm, Right Updated: 06/20/25 1038     LACTIC ACID 1.3 mmol/L     Narrative:      Result may be elevated if tourniquet was used during collection.    Procalcitonin [[id number]]  (Abnormal) Collected: 06/20/25 0922    Lab Status: Final result Specimen: Blood from Arm, Left Updated: 06/20/25 1027     Procalcitonin 1.04 ng/ml     Blood culture #1 [[id number]] Collected: 06/20/25 1016    Lab Status: In process Specimen: Blood from Arm, Left Updated: 06/20/25 1019    Blood culture #2 [[id number]] Collected: 06/20/25 1016    Lab Status: In process Specimen: Blood from Arm, Right Updated: 06/20/25 1019    CMP [[id number]]  (Abnormal) Collected: 06/20/25 0922    Lab Status: Final result Specimen: Blood from Arm, Left Updated: 06/20/25 0955     Sodium 137 mmol/L      Potassium 3.8 mmol/L      Chloride 105 mmol/L      CO2 21 mmol/L      ANION GAP 11 mmol/L      BUN 13 mg/dL      Creatinine 0.76 mg/dL      Glucose 122 mg/dL      Calcium 9.1 mg/dL      AST 26 U/L      ALT 28 U/L      Alkaline Phosphatase 62 U/L      Total Protein 6.8 g/dL      Albumin 4.0 g/dL      Total Bilirubin 1.03 mg/dL      eGFR 112 ml/min/1.73sq m     Narrative:      National Kid[employer] Foundation guidelines for Chronic Kidney Disease (CKD):     Stage 1 with normal or high GFR (GFR > 90 mL/min/1.73 square meters)    Stage 2 Mild CKD (GFR = 60-89 mL/min/1.73 square meters)    Stage 3A Moderate CKD (GFR = 45-59 mL/min/1.73 square meters)    Stage 3B Moderate CKD  (GFR = 30-44 mL/min/1.73 square meters)    Stage 4 Severe CKD (GFR = 15-29 mL/min/1.73 square meters)    Stage 5 End Stage CKD (GFR <15 mL/min/1.73 square meters)  Note: GFR calculation is accurate only with a steady state creatinine    Lipase [[id number]]  (Normal) Collected: 06/20/25 0922    Lab Status: Final result Specimen: Blood from Arm, Left Updated: 06/20/25 0955     Lipase 76 u/L     CBC and differential [[id number]]  (Abnormal) Collected: 06/20/25 0922    Lab Status: Final result Specimen: Blood from Arm, Left Updated: 06/20/25 0936     WBC 21.73 Thousand/uL      RBC 5.93 Million/uL      Hemoglobin 18.1 g/dL      Hematocrit 55.4 %      MCV 93 fL      MCH 30.5 pg      MCHC 32.7 g/dL      RDW 13.0 %      MPV 9.5 fL      Platelets 388 Thousands/uL      nRBC 0 /100 WBCs      Segmented % 87 %      Immature Grans % 0 %      Lymphocytes % 8 %      Monocytes % 4 %      Eosinophils Relative 0 %      Basophils Relative 1 %      Absolute Neutrophils 18.93 Thousands/µL      Absolute Immature Grans 0.09 Thousand/uL      Absolute Lymphocytes 1.62 Thousands/µL      Absolute Monocytes 0.89 Thousand/µL      Eosinophils Absolute 0.06 Thousand/µL      Basophils Absolute 0.14 Thousands/µL     UA w Reflex to Microscopic w Reflex to Culture [[id number]]     Lab Status: No result Specimen: Urine             CT abdomen pelvis with contrast   Final Interpretation by Malika Amanda MD (06/20 1035)      Hollow viscus perforation with pneumoperitoneum and ascites   Although the exact site of perforation is difficult to discern, the free air is predominantly distributed in the upper abdomen generally favors perforation in the gastroduodenal area      Mild thickening of the sigmoid colon   Patent mesenteric vasculature         I personally discussed this study with BRYANT MICHELLE and consulting surgeon on 6/20/2025 10:25 AM.               Resident: TI FARR I, the attending radiologist, have reviewed the images and  agree with the final report above.      Workstation performed: [id number]             Procedures    ED Medication and Procedure Management   None     There are no discharge medications for this patient.    No discharge procedures on file.  ED SEPSIS DOCUMENTATION   Time reflects when diagnosis was documented in both MDM as applicable and the Disposition within this note       Time User Action Codes Description Comment    6/20/2025 10:43 AM Janie Tate Add [R19.8] Perforated viscus     6/20/2025 10:52 AM Mariel Gomez Add [R19.8] Perforation of viscus     6/20/2025 12:45 PM Yennifer Nicholson Modify [R19.8] Perforated viscus                    [1] No past medical history on file.  [2] No past surgical history on file.  [3] No family history on file.  [4]   Social History  Tobacco Use    Smoking status: Every Day     Current packs/day: 1.50     Average packs/day: 1.5 packs/day for 23.5 years (35.2 ttl pk-yrs)     Types: Cigarettes     Start date: 2002     Passive exposure: Never    Smokeless tobacco: Never   Vaping Use    Vaping status: Never Used   Substance Use Topics    Alcohol use: Yes     Alcohol/week: 30.0 standard drinks of alcohol     Types: 30 Cans of beer per week     Comment: daily beer drinker    Drug use: Not Currently        Mariel Gomez DO  06/20/25 8511

## 2025-06-20 NOTE — ED NOTES
Pt transported to OR by TP. No s/s of distress, VS stable, A&Ox4, ID band in place.      Marisela Figueroa RN  06/20/25 1111

## 2025-06-21 PROBLEM — F10.10 ALCOHOL ABUSE: Status: ACTIVE | Noted: 2025-06-21

## 2025-06-21 PROBLEM — Z72.0 TOBACCO ABUSE: Status: ACTIVE | Noted: 2025-06-21

## 2025-06-21 LAB
ANION GAP SERPL CALCULATED.3IONS-SCNC: 6 MMOL/L (ref 4–13)
BASOPHILS # BLD AUTO: 0.04 THOUSANDS/ÂΜL (ref 0–0.1)
BASOPHILS NFR BLD AUTO: 0 % (ref 0–1)
BUN SERPL-MCNC: 11 MG/DL (ref 5–25)
CALCIUM SERPL-MCNC: 7.7 MG/DL (ref 8.4–10.2)
CHLORIDE SERPL-SCNC: 105 MMOL/L (ref 96–108)
CO2 SERPL-SCNC: 26 MMOL/L (ref 21–32)
CREAT SERPL-MCNC: 0.65 MG/DL (ref 0.6–1.3)
EOSINOPHIL # BLD AUTO: 0.01 THOUSAND/ÂΜL (ref 0–0.61)
EOSINOPHIL NFR BLD AUTO: 0 % (ref 0–6)
ERYTHROCYTE [DISTWIDTH] IN BLOOD BY AUTOMATED COUNT: 13 % (ref 11.6–15.1)
GFR SERPL CREATININE-BSD FRML MDRD: 119 ML/MIN/1.73SQ M
GLUCOSE SERPL-MCNC: 118 MG/DL (ref 65–140)
HCT VFR BLD AUTO: 47.1 % (ref 36.5–49.3)
HGB BLD-MCNC: 15.6 G/DL (ref 12–17)
IMM GRANULOCYTES # BLD AUTO: 0.05 THOUSAND/UL (ref 0–0.2)
IMM GRANULOCYTES NFR BLD AUTO: 0 % (ref 0–2)
LYMPHOCYTES # BLD AUTO: 0.95 THOUSANDS/ÂΜL (ref 0.6–4.47)
LYMPHOCYTES NFR BLD AUTO: 6 % (ref 14–44)
MCH RBC QN AUTO: 30.6 PG (ref 26.8–34.3)
MCHC RBC AUTO-ENTMCNC: 33.1 G/DL (ref 31.4–37.4)
MCV RBC AUTO: 93 FL (ref 82–98)
MONOCYTES # BLD AUTO: 1.01 THOUSAND/ÂΜL (ref 0.17–1.22)
MONOCYTES NFR BLD AUTO: 7 % (ref 4–12)
NEUTROPHILS # BLD AUTO: 13.25 THOUSANDS/ÂΜL (ref 1.85–7.62)
NEUTS SEG NFR BLD AUTO: 87 % (ref 43–75)
NRBC BLD AUTO-RTO: 0 /100 WBCS
PLATELET # BLD AUTO: 249 THOUSANDS/UL (ref 149–390)
PMV BLD AUTO: 9.7 FL (ref 8.9–12.7)
POTASSIUM SERPL-SCNC: 3.7 MMOL/L (ref 3.5–5.3)
RBC # BLD AUTO: 5.09 MILLION/UL (ref 3.88–5.62)
SODIUM SERPL-SCNC: 137 MMOL/L (ref 135–147)
WBC # BLD AUTO: 15.31 THOUSAND/UL (ref 4.31–10.16)

## 2025-06-21 PROCEDURE — 99024 POSTOP FOLLOW-UP VISIT: CPT | Performed by: SURGERY

## 2025-06-21 PROCEDURE — 85025 COMPLETE CBC W/AUTO DIFF WBC: CPT | Performed by: SURGERY

## 2025-06-21 PROCEDURE — 80048 BASIC METABOLIC PNL TOTAL CA: CPT | Performed by: SURGERY

## 2025-06-21 RX ADMIN — METRONIDAZOLE 500 MG: 500 INJECTION, SOLUTION INTRAVENOUS at 08:41

## 2025-06-21 RX ADMIN — PHENOL 1 SPRAY: 1.5 LIQUID ORAL at 20:56

## 2025-06-21 RX ADMIN — HEPARIN SODIUM 5000 UNITS: 5000 INJECTION INTRAVENOUS; SUBCUTANEOUS at 06:37

## 2025-06-21 RX ADMIN — SODIUM CHLORIDE 125 ML/HR: 0.9 INJECTION, SOLUTION INTRAVENOUS at 09:55

## 2025-06-21 RX ADMIN — PANTOPRAZOLE SODIUM 40 MG: 40 INJECTION, POWDER, FOR SOLUTION INTRAVENOUS at 20:56

## 2025-06-21 RX ADMIN — METRONIDAZOLE 500 MG: 500 INJECTION, SOLUTION INTRAVENOUS at 02:34

## 2025-06-21 RX ADMIN — CEFTRIAXONE 2000 MG: 2 INJECTION, SOLUTION INTRAVENOUS at 18:01

## 2025-06-21 RX ADMIN — PANTOPRAZOLE SODIUM 40 MG: 40 INJECTION, POWDER, FOR SOLUTION INTRAVENOUS at 08:38

## 2025-06-21 RX ADMIN — NICOTINE 1 PATCH: 14 PATCH, EXTENDED RELEASE TRANSDERMAL at 08:37

## 2025-06-21 RX ADMIN — METRONIDAZOLE 500 MG: 500 INJECTION, SOLUTION INTRAVENOUS at 16:46

## 2025-06-21 RX ADMIN — SODIUM CHLORIDE 125 ML/HR: 0.9 INJECTION, SOLUTION INTRAVENOUS at 01:01

## 2025-06-21 RX ADMIN — SODIUM CHLORIDE 125 ML/HR: 0.9 INJECTION, SOLUTION INTRAVENOUS at 19:00

## 2025-06-21 RX ADMIN — HEPARIN SODIUM 5000 UNITS: 5000 INJECTION INTRAVENOUS; SUBCUTANEOUS at 15:14

## 2025-06-21 RX ADMIN — HEPARIN SODIUM 5000 UNITS: 5000 INJECTION INTRAVENOUS; SUBCUTANEOUS at 20:56

## 2025-06-21 NOTE — UTILIZATION REVIEW
Initial Clinical Review    Admission: Date/Time/Statement:   Admission Orders (From admission, onward)       Ordered        06/20/25 1052  INPATIENT ADMISSION  Once                          Orders Placed This Encounter   Procedures    INPATIENT ADMISSION     Standing Status:   Standing     Number of Occurrences:   1     Level of Care:   Med Surg [16]     Estimated length of stay:   More than 2 Midnights     Certification:   I certify that inpatient services are medically necessary for this patient for a duration of greater than two midnights. See H&P and MD Progress Notes for additional information about the patient's course of treatment.     ED Arrival Information       Expected   -    Arrival   6/20/2025 09:04    Acuity   Urgent              Means of arrival   Walk-In    Escorted by   Family Member    Service   Surgery-General    Admission type   Emergency              Arrival complaint   Abdominal pain, vomiting             Chief Complaint   Patient presents with    Abdominal Pain     Pt reports waking with diffuse abdominal pain and vomiting        Initial Presentation: 44 y.o. male with no significant PMHx who presents to ED as a walk-in with sudden onset severe generalized abdominal pain since this morning. He has associated multiple episodes of vomiting. He was eating and drinking without difficulty throughout yesterday. In ED on exam he was found on exam to have a rigid abdomen with diffuse tenderness and guarding. VSS. WBC 21.73, Hgb 18.1. CT abdomen showed ascites and pneumoperitoneum in the upper abdomen, subdiaphragmatic region and near the royer hepatis concerning for perforated abdominal viscus in the gastroduodenal area.   Admitted Inpatient to [facility] under surgery service with plan to OR ASAP for exploratory laparotomy. Ancef on call to the OR. IVFs. Analgesics/antiemetics. Npo.     OPERATIVE REPORT  SURGERY DATE: 6/20/2025  Postop diagnosis:  Pneumoperitoneum secondary to perforated gastric ulcer  at pylorus with peritonitis    Procedure(s):  Exploratory laparotomy. Omar patch repair of perforated ulcer of pylorus  Anesthesia Type:   General   Operative Findings:  The gallbladder was adhered to the stomach at the site of the perforation by a small adhesion.  Once this adhesion was lysed, the perforation was identified  at the gastric pylorus.  It measured approximately 1 cm.  There was significant inflammation of the area consistent with probable ulcer.  Biopsy was obtained.  There was gastric content contamination of all 4 quadrants of the abdominal cavity.  Infection was present at time of surgery as evidenced by contamination from gastric contents.      Date: 6/21   Day 2: Pain controlled. He thinks that he is passing some flatus. NGT, NPO. He was a former heavy drinker and smoker. WBC 15.31, Hgb 15.6. Abdomen nondistended, positive bowel sounds are heard. Midline abdominal scar, dressing clean and dry, appropriate TTP. Surgical drain intact. Continue IVFs. OOB tochair. Prn analgesics, antiemetics. Elvin IV Ofirmev q 6 hr. Keep NGT to low wall suction. Monitor I/Os. IV PPI. Continue IV ceftriaxone and metronidazole. Monitor labs in AM. Continue luna cath for another 24 hrs. Surgical drain to remain. Nicotine patch. Encourage IS, OOB. UGI series in 3-4 days to ensure no leak before advancing diet.     ED Treatment-Medication Administration from 06/20/2025 0902 to 06/20/2025 1114         Date/Time Order Dose Route Action     06/20/2025 0922 sodium chloride 0.9 % bolus 1,000 mL 1,000 mL Intravenous New Bag     06/20/2025 0925 droperidol (INAPSINE) injection 1.25 mg 1.25 mg Intravenous Given     06/20/2025 0924 morphine injection 2 mg 2 mg Intravenous Given     06/20/2025 0937 HYDROmorphone (DILAUDID) injection 1 mg 1 mg Intravenous Given     06/20/2025 1027 piperacillin-tazobactam (ZOSYN) IVPB 4.5 g 4.5 g Intravenous New Bag       Scheduled Medications:  cefTRIAXone, 2,000 mg, Intravenous, Q24H  heparin  (porcine), 5,000 Units, Subcutaneous, Q8H [facility]  metroNIDAZOLE, 500 mg, Intravenous, Q8H  nicotine, 1 patch, Transdermal, Daily  pantoprazole, 40 mg, Intravenous, Q12H [facility]      Continuous IV Infusions:  sodium chloride, 125 mL/hr, Intravenous, Continuous      PRN Meds:  acetaminophen, 1,000 mg, Intravenous, Q6H PRN  HYDROmorphone, 0.2 mg, Intravenous, Q4H PRN  HYDROmorphone, 0.5 mg, Intravenous, Q3H PRN  ondansetron, 4 mg, Intravenous, Q6H PRN      ED Triage Vitals [06/20/25 0911]   Temperature Pulse Respirations Blood Pressure SpO2 Pain Score   98 °F (36.7 °C) 90 18 119/73 99 % 10 - Worst Possible Pain     Weight (last 2 days)       Date/Time Weight    06/20/25 1140 69.4 (153)    06/20/25 0911 69.8 (153.88)            Vital Signs (last 3 days)       Date/Time Temp Pulse Resp BP MAP (mmHg) SpO2 O2 Flow Rate (L/min) O2 Device Chey Coma Scale Score Pain    06/21/25 0800 -- -- -- -- -- 94 % -- None (Room air) 15 2    06/21/25 06:29:12 97 °F (36.1 °C) 89 20 127/73 91 94 % -- -- -- --    06/21/25 0100 -- -- -- -- -- -- -- -- -- No Pain    06/20/25 23:16:21 97 °F (36.1 °C) 89 16 116/80 92 95 % -- -- -- --    06/20/25 2100 -- -- -- -- -- -- -- None (Room air) 14 --    06/20/25 1648 -- -- -- -- -- -- -- None (Room air) 14 4    06/20/25 16:35:17 -- 87 -- 113/84 94 96 % -- -- -- --    06/20/25 16:32:25 -- 88 -- 113/84 94 96 % -- -- -- --    06/20/25 1607 97.2 °F (36.2 °C) 87 12 121/87 100 96 % -- None (Room air) 15 2    06/20/25 1552 -- 88 14 124/87 101 96 % -- None (Room air) 15 --    06/20/25 1537 -- 88 12 122/85 97 95 % -- None (Room air) 15 No Pain    06/20/25 1522 -- 92 14 122/84 97 96 % -- None (Room air) 15 No Pain    06/20/25 1507 97.8 °F (36.6 °C) 98 14 134/97 111 96 % -- None (Room air) 14 No Pain    06/20/25 1452 97.4 °F (36.3 °C) 101 15 134/92 109 100 % 6 L/min Simple mask 14 --    06/20/25 1437 98.1 °F (36.7 °C) 107 14 142/93 114 100 % 6 L/min Simple mask 7 --    06/20/25 1140 97 °F (36.1 °C) 94 20 120/82 -- 95  % -- None (Room air) -- 6    06/20/25 1123 -- -- -- -- -- -- -- -- 15 6    06/20/25 1100 -- 90 18 122/85 98 96 % -- -- -- --    06/20/25 1046 -- -- -- -- -- -- -- -- 15 --    06/20/25 1045 -- 94 -- 137/91 108 97 % -- -- -- --    06/20/25 1030 -- 91 -- 122/88 100 96 % -- -- -- --    06/20/25 1015 -- 90 -- 131/87 104 97 % -- -- -- --    06/20/25 1000 -- 89 18 129/84 99 97 % -- -- -- --    06/20/25 0937 -- -- -- -- -- -- -- -- -- 10 - Worst Possible Pain    06/20/25 0924 -- -- -- -- -- -- -- -- -- 10 - Worst Possible Pain    06/20/25 0911 98 °F (36.7 °C) 90 18 119/73 91 99 % -- -- -- 10 - Worst Possible Pain       Pertinent Labs/Diagnostic Test Results:   Radiology:  CT abdomen pelvis with contrast   Final Interpretation by Malika Amanda MD (06/20 1035)      Hollow viscus perforation with pneumoperitoneum and ascites   Although the exact site of perforation is difficult to discern, the free air is predominantly distributed in the upper abdomen generally favors perforation in the gastroduodenal area      Mild thickening of the sigmoid colon   Patent mesenteric vasculature         I personally discussed this study with BRYANT MICHELLE and consulting surgeon on 6/20/2025 10:25 AM.               Resident: TI FARR I, the attending radiologist, have reviewed the images and agree with the final report above.      Workstation performed: [id number]               Results from last 7 days   Lab Units 06/21/25  0503 06/20/25  1653 06/20/25  0922   WBC Thousand/uL 15.31*  --  21.73*   HEMOGLOBIN g/dL 15.6  --  18.1*   HEMATOCRIT % 47.1  --  55.4*   PLATELETS Thousands/uL 249 259 388   TOTAL NEUT ABS Thousands/µL 13.25*  --  18.93*     Results from last 7 days   Lab Units 06/21/25  0503 06/20/25  0922   SODIUM mmol/L 137 137   POTASSIUM mmol/L 3.7 3.8   CHLORIDE mmol/L 105 105   CO2 mmol/L 26 21   ANION GAP mmol/L 6 11   BUN mg/dL 11 13   CREATININE mg/dL 0.65 0.76   EGFR ml/min/1.73sq m 119 112   CALCIUM  mg/dL 7.7* 9.1     Results from last 7 days   Lab Units 06/20/25  0922   AST U/L 26   ALT U/L 28   ALK PHOS U/L 62   TOTAL PROTEIN g/dL 6.8   ALBUMIN g/dL 4.0   TOTAL BILIRUBIN mg/dL 1.03*       Results from last 7 days   Lab Units 06/21/25  0503 06/20/25  0922   GLUCOSE RANDOM mg/dL 118 122     Results from last 7 days   Lab Units 06/20/25  0922   PROCALCITONIN ng/ml 1.04*     Results from last 7 days   Lab Units 06/20/25  1016   LACTIC ACID mmol/L 1.3     Results from last 7 days   Lab Units 06/20/25  0922   LIPASE u/L 76     Results from last 7 days   Lab Units 06/20/25  1654   CLARITY UA  Clear   COLOR UA  Yellow   SPEC GRAV UA  1.020   PH UA  5.5   GLUCOSE UA mg/dl Negative   KETONES UA mg/dl Negative   BLOOD UA  Negative   PROTEIN UA mg/dl Trace*   NITRITE UA  Negative   BILIRUBIN UA  Small*   UROBILINOGEN UA E.U./dl 0.2   LEUKOCYTES UA  Negative   WBC UA /hpf 0-1   RBC UA /hpf None Seen   BACTERIA UA /hpf Occasional   EPITHELIAL CELLS WET PREP /hpf Occasional     Results from last 7 days   Lab Units 06/20/25  1242 06/20/25  1016   BLOOD CULTURE   --  Received in Microbiology Lab. Culture in Progress.  Received in Microbiology Lab. Culture in Progress.   GRAM STAIN RESULT  1+ Disintegrating polys  No bacteria seen  --    BODY FLUID CULTURE, STERILE  No growth  --        Past Medical History[1]  Present on Admission:  **None**      Admitting Diagnosis: Abdominal pain [R10.9]  Perforated viscus [R19.8]  Perforation of viscus [R19.8]  Age/Sex: 44 y.o. male    Network Utilization Review Department  ATTENTION: Please call with any questions or concerns to [phone] and carefully listen to the prompts so that you are directed to the right person. All voicemails are confidential.   For Discharge needs, contact Care Management DC Support Team at [phone] opt. 2  Send all requests for admission clinical reviews, approved or denied determinations and any other requests to dedicated fax number below belonging to  the [place] where the patient is receiving treatment. List of dedicated fax numbers for the Facilities:  FACILITY NAME UR FAX NUMBER   ADMISSION DENIALS (Administrative/Medical Necessity) [phone]   DISCHARGE SUPPORT TEAM (NETWORK) [phone]   PARENT CHILD HEALTH (Maternity/NICU/Pediatrics) [phone]   [facility] [phone]   [facility] [phone]   [facility] [phone]   [facility] [phone]   Critical access hospital [phone]   [facility] [phone]   [facility] [phone]   [facility] [phone]   [facility] [phone]   [facility] [phone]   [facility] [phone]   [facility] [phone]              [1] No past medical history on file.

## 2025-06-21 NOTE — ASSESSMENT & PLAN NOTE
Postop day #1 status post exploratory laparotomy, Omar patch repair of perforated pyloric ulcer.    WBC 15.31  Hemoglobin stable 15.6  BMP normal electrolytes, creatinine 0.65.    Surgical drain 180 mL serosanguineous fluid since surgery  NG tube output 1050 mL, 200 mL currently in canister appears dark brownish.  Urine output 1255 mL, Man indwelling.    He relates his pain as a 2 or 3 on a 10 point pain scale.  No fever or chills.    Abdomen nondistended, positive bowel sounds are heard.  Midline abdominal scar, dressing clean and dry, appropriate TTP.  Surgical drain intact.    Vital signs stable, afebrile.

## 2025-06-21 NOTE — PROGRESS NOTES
Progress Note - Surgery-General   Name: Mykel Gonzales 44 y.o. male I MRN: [id number]  Unit/Bed#: [facility] I Date of Admission: 6/20/2025   Date of Service: 6/21/2025 I Hospital Day: 1     Assessment & Plan  Pneumoperitoneum    Postop day #1 status post exploratory laparotomy, Omar patch repair of perforated pyloric ulcer.    WBC 15.31  Hemoglobin stable 15.6  BMP normal electrolytes, creatinine 0.65.    Surgical drain 180 mL serosanguineous fluid since surgery  NG tube output 1050 mL, 200 mL currently in canister appears dark brownish.  Urine output 1255 mL, Man indwelling.    He relates his pain as a 2 or 3 on a 10 point pain scale.  No fever or chills.    Abdomen nondistended, positive bowel sounds are heard.  Midline abdominal scar, dressing clean and dry, appropriate TTP.  Surgical drain intact.    Vital signs stable, afebrile.    Perforation of viscus    44-year-old male seen this morning, first postop day status post exploratory laparotomy.  Presented with pneumoperitoneum.  He underwent exploratory laparotomy, found to have perforated pyloric ulcer and Omar patch repair was performed.  He is Samoan speaking but does comprehend English, his wife is here with him.  Pain seems to be under good control at present.  He thinks that he is passing some flatus.  NG tube in place, he understands that he will remain n.p.o.  The patient was a former heavy drinker and smoker.    PLAN:    Management discussed via telephone this morning with Dr. Devonte Canela who will examine the patient later today.  Maintain IV fluids for hydration  Out of bed to chair today  Analgesics and antiemetics as ordered as needed.  Scheduled IV Ofirmev every 6 hours.  Keep NG tube to low wall suction,  Record LIV's by nursing  IV PPI, Protonix 40 mg every 12 hours  Continue IV ceftriaxone and metronidazole  Repeat a.m. labs, CBC, BMP, magnesium  Man catheter to remain in place for another 24 hours  Surgical drain to  remain  NicoDerm CQ 14 mg topical patch daily  Patient encouraged use of incentive spirometry 10 times hourly while awake throughout the day  Upper GI series in 3 to 4 days to ensure no leak before advancing diet.    Please contact the SecureChat role for the Surgery-General service with any questions/concerns.    Subjective     No overnight events.  Pain is tolerable, right now he relates as a 2 or 3.  He speaks Montenegrin, wife is present with him and they both comprehend English.  NG tube remains in place.  He removed his NicoDerm patch this morning, he formally smoked 1.5 packs/day.  He admits some flatus.  No nausea but dry mouth with NG tube in place.    Scheduled Meds:  Current Facility-Administered Medications   Medication Dose Route Frequency Provider Last Rate    acetaminophen  1,000 mg Intravenous Q6H PRN Janie Tate, DO      cefTRIAXone  2,000 mg Intravenous Q24H Janie Tate, DO 2,000 mg (06/20/25 1723)    heparin (porcine)  5,000 Units Subcutaneous Q8H [facility] Janie Tate, DO      HYDROmorphone  0.2 mg Intravenous Q4H PRN Janie Tate, DO      HYDROmorphone  0.5 mg Intravenous Q3H PRN Janie Tate, DO      metroNIDAZOLE  500 mg Intravenous Q8H Janie Tate,  mg (06/21/25 0234)    nicotine  1 patch Transdermal Daily Janie Tate, DO      ondansetron  4 mg Intravenous Q6H PRN Janie Tate, DO      pantoprazole  40 mg Intravenous Q12H [facility] Janie Tate, DO      sodium chloride  125 mL/hr Intravenous Continuous Janie Tate  mL/hr (06/21/25 0101)     Continuous Infusions:sodium chloride, 125 mL/hr, Last Rate: 125 mL/hr (06/21/25 0101)      PRN Meds:.  acetaminophen    HYDROmorphone    HYDROmorphone    ondansetron      Objective :  Temp:  [97 °F (36.1 °C)-98.1 °F (36.7 °C)] 97 °F (36.1 °C)  HR:  [] 89  BP: (113-142)/(73-97) 127/73  Resp:  [12-20] 20  SpO2:  [94 %-100 %] 94 %  O2 Device: None (Room air)    I/O         06/19 0701 06/20  0700 06/20 0701  06/21 0700 06/21 0701  06/22 0700    I.V. (mL/kg)  2583.3 (37.2)     IV Piggyback  1250     Total Intake(mL/kg)  3833.3 (55.2)     Urine (mL/kg/hr)  1255     Emesis/NG output  1050 250    Drains  180 40    Other  750     Total Output  3235 290    Net  +598.3 -290                 Lines/Drains/Airways       Active Status       Name Placement date Placement time Site Days    Closed/Suction Drain RLQ Bulb 19 Fr. 06/20/25  1322  RLQ  less than 1    NG/OG/Enteral Tube Nasogastric 18 Fr Right nare 06/20/25  1346  Right nare  less than 1    Urethral Catheter Non-latex 16 Fr. 06/20/25  1230  Non-latex  less than 1                  Physical Exam  Vitals reviewed.   HENT:      Head: Normocephalic.      Nose: Nose normal.      Comments: NG tube in place, about 200 mL brownish fluid in canister.     Mouth/Throat:      Mouth: Mucous membranes are moist.     Eyes:      Conjunctiva/sclera: Conjunctivae normal.       Cardiovascular:      Rate and Rhythm: Normal rate and regular rhythm.      Heart sounds: Normal heart sounds. No murmur heard.     No gallop.   Pulmonary:      Effort: Pulmonary effort is normal. No respiratory distress.      Breath sounds: Normal breath sounds. No wheezing or rales.   Abdominal:      General: Abdomen is flat. There is no distension.      Palpations: Abdomen is soft. There is no mass.      Tenderness: There is abdominal tenderness. There is no right CVA tenderness, left CVA tenderness, guarding or rebound.      Hernia: No hernia is present.      Comments: Midline abdominal dressing clean and dry, incisional TTP.  Surgical drain in place, about 10 to 20 mL SS fluid in grenade.     Musculoskeletal:         General: No swelling or tenderness.      Cervical back: Normal range of motion and neck supple.      Right lower leg: No edema.      Left lower leg: No edema.     Skin:     Coloration: Skin is not jaundiced or pale.      Findings: No bruising, erythema, lesion or rash.     Neurological:  "     General: No focal deficit present.      Mental Status: He is alert and oriented to person, place, and time.      Cranial Nerves: No cranial nerve deficit.      Motor: No weakness.     Psychiatric:         Mood and Affect: Mood normal.         Thought Content: Thought content normal.         Judgment: Judgment normal.         Lab Results: I have reviewed the following results:  Recent Labs     06/20/25  0922 06/20/25  1016 06/20/25  1653 06/21/25  0503   WBC 21.73*  --   --  15.31*   HGB 18.1*  --   --  15.6   HCT 55.4*  --   --  47.1     --    < > 249   SODIUM 137  --   --  137   K 3.8  --   --  3.7     --   --  105   CO2 21  --   --  26   BUN 13  --   --  11   CREATININE 0.76  --   --  0.65   GLUC 122  --   --  118   AST 26  --   --   --    ALT 28  --   --   --    ALB 4.0  --   --   --    TBILI 1.03*  --   --   --    ALKPHOS 62  --   --   --    LACTICACID  --  1.3  --   --     < > = values in this interval not displayed.       Imaging Results Review: No pertinent imaging studies reviewed.  Other Study Results Review: No additional pertinent studies reviewed.    VTE Pharmacologic Prophylaxis: Heparin  VTE Mechanical Prophylaxis: sequential compression device    Casper Cevallos PA-C    **Please Note: Portions of the record may have been created using voice recognition software.  Occasional wrong word or \"sound a like\" substitutions may have occurred due to the inherent limitations of voice recognition software.  Read the chart carefully and recognize, using context, where substitutions have occurred.**    "

## 2025-06-21 NOTE — PLAN OF CARE
Problem: PAIN - ADULT  Goal: Verbalizes/displays adequate comfort level or baseline comfort level  Description: Interventions:  - Encourage patient to monitor pain and request assistance  - Assess pain using appropriate pain scale  - Administer analgesics as ordered based on type and severity of pain and evaluate response  - Implement non-pharmacological measures as appropriate and evaluate response  - Consider cultural and social influences on pain and pain management  - Notify physician/advanced practitioner if interventions unsuccessful or patient reports new pain  - Educate patient/family on pain management process including their role and importance of  reporting pain   - Provide non-pharmacologic/complimentary pain relief interventions  Outcome: Progressing     Problem: INFECTION - ADULT  Goal: Absence of fever/infection during neutropenic period  Description: INTERVENTIONS:  - Monitor WBC  - Perform strict hand hygiene  - Limit to healthy visitors only  - No plants, dried, fresh or silk flowers with bardales in patient room  Outcome: Progressing

## 2025-06-21 NOTE — PLAN OF CARE
Problem: PAIN - ADULT  Goal: Verbalizes/displays adequate comfort level or baseline comfort level  Description: Interventions:  - Encourage patient to monitor pain and request assistance  - Assess pain using appropriate pain scale  - Administer analgesics as ordered based on type and severity of pain and evaluate response  - Implement non-pharmacological measures as appropriate and evaluate response  - Consider cultural and social influences on pain and pain management  - Notify physician/advanced practitioner if interventions unsuccessful or patient reports new pain  - Educate patient/family on pain management process including their role and importance of  reporting pain   - Provide non-pharmacologic/complimentary pain relief interventions  Outcome: Progressing     Problem: INFECTION - ADULT  Goal: Absence or prevention of progression during hospitalization  Description: INTERVENTIONS:  - Assess and monitor for signs and symptoms of infection  - Monitor lab/diagnostic results  - Monitor all insertion sites, i.e. indwelling lines, tubes, and drains  - Monitor endotracheal if appropriate and nasal secretions for changes in amount and color  - [place] appropriate cooling/warming therapies per order  - Administer medications as ordered  - Instruct and encourage patient and family to use good hand hygiene technique  - Identify and instruct in appropriate isolation precautions for identified infection/condition  Outcome: Progressing  Goal: Absence of fever/infection during neutropenic period  Description: INTERVENTIONS:  - Monitor WBC  - Perform strict hand hygiene  - Limit to healthy visitors only  - No plants, dried, fresh or silk flowers with bardales in patient room  Outcome: Progressing     Problem: SAFETY ADULT  Goal: Patient will remain free of falls  Description: INTERVENTIONS:  - Educate patient/family on patient safety including physical limitations  - Instruct patient to call for assistance with activity   -  Consider consulting OT/PT to assist with strengthening/mobility based on AM PAC & JH-HLM score  - Consult OT/PT to assist with strengthening/mobility   - Keep Call bell within reach  - Keep bed low and locked with side rails adjusted as appropriate  - Keep care items and personal belongings within reach  - Initiate and maintain comfort rounds  - Make Fall Risk Sign visible to staff  - Offer Toileting every   Hours, in advance of need  - Initiate/Maintain  alarm  - Obtain necessary fall risk management equipment:    - Apply yellow socks and bracelet for high fall risk patients  - Consider moving patient to room near nurses station  Outcome: Progressing  Goal: Maintain or return to baseline ADL function  Description: INTERVENTIONS:  -  Assess patient's ability to carry out ADLs; assess patient's baseline for ADL function and identify physical deficits which impact ability to perform ADLs (bathing, care of mouth/teeth, toileting, grooming, dressing, etc.)  - Assess/evaluate cause of self-care deficits   - Assess range of motion  - Assess patient's mobility; develop plan if impaired  - Assess patient's need for assistive devices and provide as appropriate  - Encourage maximum independence but intervene and supervise when necessary  - Involve family in performance of ADLs  - Assess for home care needs following discharge   - Consider OT consult to assist with ADL evaluation and planning for discharge  - Provide patient education as appropriate  - Monitor functional capacity and physical performance, use of AM PAC & JH-HLM   - Monitor gait, balance and fatigue with ambulation    Outcome: Progressing  Goal: Maintains/Returns to pre admission functional level  Description: INTERVENTIONS:  - Perform AM-PAC 6 Click Basic Mobility/ Daily Activity assessment daily.  - Set and communicate daily mobility goal to care team and patient/family/caregiver.   - Collaborate with rehabilitation services on mobility goals if consulted  -  Perform Range of Motion   times a day.  - Reposition patient every   hours.  - Dangle patient   times a day  - Stand patient   times a day  - Ambulate patient   times a day  - Out of bed to chair   times a day   - Out of bed for meals   times a day  - Out of bed for toileting  - Record patient progress and toleration of activity level   Outcome: Progressing     Problem: DISCHARGE PLANNING  Goal: Discharge to home or other facility with appropriate resources  Description: INTERVENTIONS:  - Identify barriers to discharge w/patient and caregiver  - Arrange for needed discharge resources and transportation as appropriate  - Identify discharge learning needs (meds, wound care, etc.)  - Arrange for interpretive services to assist at discharge as needed  - Refer to Case Management Department for coordinating discharge planning if the patient needs post-hospital services based on physician/advanced practitioner order or complex needs related to functional status, cognitive ability, or social support system  Outcome: Progressing     Problem: Knowledge Deficit  Goal: Patient/family/caregiver demonstrates understanding of disease process, treatment plan, medications, and discharge instructions  Description: Complete learning assessment and assess knowledge base.  Interventions:  - Provide teaching at level of understanding  - Provide teaching via preferred learning methods  Outcome: Progressing     Problem: Nutrition/Hydration-ADULT  Goal: Nutrient/Hydration intake appropriate for improving, restoring or maintaining nutritional needs  Description: Monitor and assess patient's nutrition/hydration status for malnutrition. Collaborate with interdisciplinary team and initiate plan and interventions as ordered.  Monitor patient's weight and dietary intake as ordered or per policy. Utilize nutrition screening tool and intervene as necessary. Determine patient's food preferences and provide high-protein, high-caloric foods as  appropriate.     INTERVENTIONS:  - Monitor oral intake, urinary output, labs, and treatment plans  - Assess nutrition and hydration status and recommend course of action  - Evaluate amount of meals eaten  - Assist patient with eating if necessary   - Allow adequate time for meals  - Recommend/ encourage appropriate diets, oral nutritional supplements, and vitamin/mineral supplements  - Order, calculate, and assess calorie counts as needed  - Recommend, monitor, and adjust tube feedings and TPN/PPN based on assessed needs  - Assess need for intravenous fluids  - Provide specific nutrition/hydration education as appropriate  - Include patient/family/caregiver in decisions related to nutrition  Outcome: Progressing     Problem: GASTROINTESTINAL - ADULT  Goal: Minimal or absence of nausea and/or vomiting  Description: INTERVENTIONS:  - Administer IV fluids if ordered to ensure adequate hydration  - Maintain NPO status until nausea and vomiting are resolved  - Nasogastric tube if ordered  - Administer ordered antiemetic medications as needed  - Provide nonpharmacologic comfort measures as appropriate  - Advance diet as tolerated, if ordered  - Consider nutrition services referral to assist patient with adequate nutrition and appropriate food choices  Outcome: Progressing  Goal: Maintains or returns to baseline bowel function  Description: INTERVENTIONS:  - Assess bowel function  - Encourage oral fluids to ensure adequate hydration  - Administer IV fluids if ordered to ensure adequate hydration  - Administer ordered medications as needed  - Encourage mobilization and activity  - Consider nutritional services referral to assist patient with adequate nutrition and appropriate food choices  Outcome: Progressing  Goal: Maintains adequate nutritional intake  Description: INTERVENTIONS:  - Monitor percentage of each meal consumed  - Identify factors contributing to decreased intake, treat as appropriate  - Assist with meals as  needed  - Monitor I&O, weight, and lab values if indicated  - Obtain nutrition services referral as needed  Outcome: Progressing  Goal: Establish and maintain optimal ostomy function  Description: INTERVENTIONS:  - Assess bowel function  - Encourage oral fluids to ensure adequate hydration  - Administer IV fluids if ordered to ensure adequate hydration   - Administer ordered medications as needed  - Encourage mobilization and activity  - Nutrition services referral to assist patient with appropriate food choices  - Assess stoma site  - Consider wound care consult   Outcome: Progressing  Goal: Oral mucous membranes remain intact  Description: INTERVENTIONS  - Assess oral mucosa and hygiene practices  - Implement preventative oral hygiene regimen  - Implement oral medicated treatments as ordered  - Initiate Nutrition services referral as needed  Outcome: Progressing

## 2025-06-21 NOTE — PLAN OF CARE
Problem: PAIN - ADULT  Goal: Verbalizes/displays adequate comfort level or baseline comfort level  Description: Interventions:  - Encourage patient to monitor pain and request assistance  - Assess pain using appropriate pain scale  - Administer analgesics as ordered based on type and severity of pain and evaluate response  - Implement non-pharmacological measures as appropriate and evaluate response  - Consider cultural and social influences on pain and pain management  - Notify physician/advanced practitioner if interventions unsuccessful or patient reports new pain  - Educate patient/family on pain management process including their role and importance of  reporting pain   - Provide non-pharmacologic/complimentary pain relief interventions  6/21/2025 1137 by Na Delgado RN  Outcome: Progressing  6/21/2025 1137 by Na Delgado RN  Outcome: Progressing     Problem: INFECTION - ADULT  Goal: Absence or prevention of progression during hospitalization  Description: INTERVENTIONS:  - Assess and monitor for signs and symptoms of infection  - Monitor lab/diagnostic results  - Monitor all insertion sites, i.e. indwelling lines, tubes, and drains  - Monitor endotracheal if appropriate and nasal secretions for changes in amount and color  - [place] appropriate cooling/warming therapies per order  - Administer medications as ordered  - Instruct and encourage patient and family to use good hand hygiene technique  - Identify and instruct in appropriate isolation precautions for identified infection/condition  6/21/2025 1137 by Na Delgado RN  Outcome: Progressing  6/21/2025 1137 by Na Delgado RN  Outcome: Progressing  Goal: Absence of fever/infection during neutropenic period  Description: INTERVENTIONS:  - Monitor WBC  - Perform strict hand hygiene  - Limit to healthy visitors only  - No plants, dried, fresh or silk flowers with bardales in patient room  6/21/2025 1137 by Na Delgado RN  Outcome: Progressing  6/21/2025  1137 by Na Delgado RN  Outcome: Progressing     Problem: SAFETY ADULT  Goal: Patient will remain free of falls  Description: INTERVENTIONS:  - Educate patient/family on patient safety including physical limitations  - Instruct patient to call for assistance with activity   - Consider consulting OT/PT to assist with strengthening/mobility based on AM PAC & JH-HLM score  - Consult OT/PT to assist with strengthening/mobility   - Keep Call bell within reach  - Keep bed low and locked with side rails adjusted as appropriate  - Keep care items and personal belongings within reach  - Initiate and maintain comfort rounds  - Make Fall Risk Sign visible to staff  - Offer Toileting every 2 Hours, in advance of need  - Initiate/Maintain bed alarm  - Obtain necessary fall risk management equipment  - Apply yellow socks and bracelet for high fall risk patients  - Consider moving patient to room near nurses station  6/21/2025 1137 by Na Delgado RN  Outcome: Progressing  6/21/2025 1137 by Na Delgado RN  Outcome: Progressing  Goal: Maintain or return to baseline ADL function  Description: INTERVENTIONS:  -  Assess patient's ability to carry out ADLs; assess patient's baseline for ADL function and identify physical deficits which impact ability to perform ADLs (bathing, care of mouth/teeth, toileting, grooming, dressing, etc.)  - Assess/evaluate cause of self-care deficits   - Assess range of motion  - Assess patient's mobility; develop plan if impaired  - Assess patient's need for assistive devices and provide as appropriate  - Encourage maximum independence but intervene and supervise when necessary  - Involve family in performance of ADLs  - Assess for home care needs following discharge   - Consider OT consult to assist with ADL evaluation and planning for discharge  - Provide patient education as appropriate  - Monitor functional capacity and physical performance, use of AM PAC & JH-HLM   - Monitor gait, balance and  fatigue with ambulation    6/21/2025 1137 by Na Delgado RN  Outcome: Progressing  6/21/2025 1137 by Na Delgado RN  Outcome: Progressing  Goal: Maintains/Returns to pre admission functional level  Description: INTERVENTIONS:  - Perform AM-PAC 6 Click Basic Mobility/ Daily Activity assessment daily.  - Set and communicate daily mobility goal to care team and patient/family/caregiver.   - Collaborate with rehabilitation services on mobility goals if consulted    - Reposition patient every 2 hours.  - Dangle patient 3 times a day  - Stand patient 3 times a day  - Ambulate patient 3 times a day  - Out of bed to chair 3 times a day   - Out of bed for meals 3 times a day  - Out of bed for toileting  - Record patient progress and toleration of activity level   6/21/2025 1137 by Na Delgado RN  Outcome: Progressing  6/21/2025 1137 by Na Delgado RN  Outcome: Progressing     Problem: DISCHARGE PLANNING  Goal: Discharge to home or other facility with appropriate resources  Description: INTERVENTIONS:  - Identify barriers to discharge w/patient and caregiver  - Arrange for needed discharge resources and transportation as appropriate  - Identify discharge learning needs (meds, wound care, etc.)  - Arrange for interpretive services to assist at discharge as needed  - Refer to Case Management Department for coordinating discharge planning if the patient needs post-hospital services based on physician/advanced practitioner order or complex needs related to functional status, cognitive ability, or social support system  6/21/2025 1137 by Na Delgado RN  Outcome: Progressing  6/21/2025 1137 by Na Delgado RN  Outcome: Progressing     Problem: Knowledge Deficit  Goal: Patient/family/caregiver demonstrates understanding of disease process, treatment plan, medications, and discharge instructions  Description: Complete learning assessment and assess knowledge base.  Interventions:  - Provide teaching at level of understanding  -  Provide teaching via preferred learning methods  6/21/2025 1137 by Na Delgado RN  Outcome: Progressing  6/21/2025 1137 by Na Delgado RN  Outcome: Progressing     Problem: Nutrition/Hydration-ADULT  Goal: Nutrient/Hydration intake appropriate for improving, restoring or maintaining nutritional needs  Description: Monitor and assess patient's nutrition/hydration status for malnutrition. Collaborate with interdisciplinary team and initiate plan and interventions as ordered.  Monitor patient's weight and dietary intake as ordered or per policy. Utilize nutrition screening tool and intervene as necessary. Determine patient's food preferences and provide high-protein, high-caloric foods as appropriate.     INTERVENTIONS:  - Monitor oral intake, urinary output, labs, and treatment plans  - Assess nutrition and hydration status and recommend course of action  - Evaluate amount of meals eaten  - Assist patient with eating if necessary   - Allow adequate time for meals  - Recommend/ encourage appropriate diets, oral nutritional supplements, and vitamin/mineral supplements  - Order, calculate, and assess calorie counts as needed  - Recommend, monitor, and adjust tube feedings and TPN/PPN based on assessed needs  - Assess need for intravenous fluids  - Provide specific nutrition/hydration education as appropriate  - Include patient/family/caregiver in decisions related to nutrition  6/21/2025 1137 by Na Delgado RN  Outcome: Progressing  6/21/2025 1137 by Na Delgado RN  Outcome: Progressing     Problem: GASTROINTESTINAL - ADULT  Goal: Minimal or absence of nausea and/or vomiting  Description: INTERVENTIONS:  - Administer IV fluids if ordered to ensure adequate hydration  - Maintain NPO status until nausea and vomiting are resolved  - Nasogastric tube if ordered  - Administer ordered antiemetic medications as needed  - Provide nonpharmacologic comfort measures as appropriate  - Advance diet as tolerated, if ordered  -  Consider nutrition services referral to assist patient with adequate nutrition and appropriate food choices  6/21/2025 1137 by Na Delgado RN  Outcome: Progressing  6/21/2025 1137 by Na Delgado RN  Outcome: Progressing  Goal: Maintains or returns to baseline bowel function  Description: INTERVENTIONS:  - Assess bowel function  - Encourage oral fluids to ensure adequate hydration  - Administer IV fluids if ordered to ensure adequate hydration  - Administer ordered medications as needed  - Encourage mobilization and activity  - Consider nutritional services referral to assist patient with adequate nutrition and appropriate food choices  6/21/2025 1137 by Na Delgado RN  Outcome: Progressing  6/21/2025 1137 by Na Delgado RN  Outcome: Progressing  Goal: Maintains adequate nutritional intake  Description: INTERVENTIONS:  - Monitor percentage of each meal consumed  - Identify factors contributing to decreased intake, treat as appropriate  - Assist with meals as needed  - Monitor I&O, weight, and lab values if indicated  - Obtain nutrition services referral as needed  6/21/2025 1137 by Na Delgado RN  Outcome: Progressing  6/21/2025 1137 by Na Delgado RN  Outcome: Progressing  Goal: Establish and maintain optimal ostomy function  Description: INTERVENTIONS:  - Assess bowel function  - Encourage oral fluids to ensure adequate hydration  - Administer IV fluids if ordered to ensure adequate hydration   - Administer ordered medications as needed  - Encourage mobilization and activity  - Nutrition services referral to assist patient with appropriate food choices  - Assess stoma site  - Consider wound care consult   6/21/2025 1137 by Na Delgado RN  Outcome: Progressing  6/21/2025 1137 by Na Delgado RN  Outcome: Progressing  Goal: Oral mucous membranes remain intact  Description: INTERVENTIONS  - Assess oral mucosa and hygiene practices  - Implement preventative oral hygiene regimen  - Implement oral medicated  treatments as ordered  - Initiate Nutrition services referral as needed  6/21/2025 1137 by Na Delgado RN  Outcome: Progressing  6/21/2025 1137 by Na Delgado RN  Outcome: Progressing

## 2025-06-22 VITALS
TEMPERATURE: 97 F | HEIGHT: 68 IN | RESPIRATION RATE: 17 BRPM | WEIGHT: 153 LBS | SYSTOLIC BLOOD PRESSURE: 126 MMHG | HEART RATE: 90 BPM | OXYGEN SATURATION: 96 % | BODY MASS INDEX: 23.19 KG/M2 | DIASTOLIC BLOOD PRESSURE: 86 MMHG

## 2025-06-22 LAB
ANION GAP SERPL CALCULATED.3IONS-SCNC: 7 MMOL/L (ref 4–13)
BACTERIA BLD CULT: NORMAL
BACTERIA BLD CULT: NORMAL
BACTERIA SPEC ANAEROBE CULT: NO GROWTH
BACTERIA SPEC BFLD CULT: NORMAL
BASOPHILS # BLD AUTO: 0.03 THOUSANDS/ÂΜL (ref 0–0.1)
BASOPHILS NFR BLD AUTO: 0 % (ref 0–1)
BUN SERPL-MCNC: 16 MG/DL (ref 5–25)
CALCIUM SERPL-MCNC: 7.9 MG/DL (ref 8.4–10.2)
CHLORIDE SERPL-SCNC: 109 MMOL/L (ref 96–108)
CO2 SERPL-SCNC: 24 MMOL/L (ref 21–32)
CREAT SERPL-MCNC: 0.65 MG/DL (ref 0.6–1.3)
EOSINOPHIL # BLD AUTO: 0.13 THOUSAND/ÂΜL (ref 0–0.61)
EOSINOPHIL NFR BLD AUTO: 1 % (ref 0–6)
ERYTHROCYTE [DISTWIDTH] IN BLOOD BY AUTOMATED COUNT: 13.1 % (ref 11.6–15.1)
GFR SERPL CREATININE-BSD FRML MDRD: 119 ML/MIN/1.73SQ M
GLUCOSE SERPL-MCNC: 86 MG/DL (ref 65–140)
GRAM STN SPEC: NORMAL
GRAM STN SPEC: NORMAL
HCT VFR BLD AUTO: 45.7 % (ref 36.5–49.3)
HGB BLD-MCNC: 14.8 G/DL (ref 12–17)
IMM GRANULOCYTES # BLD AUTO: 0.07 THOUSAND/UL (ref 0–0.2)
IMM GRANULOCYTES NFR BLD AUTO: 1 % (ref 0–2)
LYMPHOCYTES # BLD AUTO: 1.44 THOUSANDS/ÂΜL (ref 0.6–4.47)
LYMPHOCYTES NFR BLD AUTO: 11 % (ref 14–44)
MAGNESIUM SERPL-MCNC: 2.1 MG/DL (ref 1.9–2.7)
MCH RBC QN AUTO: 30.8 PG (ref 26.8–34.3)
MCHC RBC AUTO-ENTMCNC: 32.4 G/DL (ref 31.4–37.4)
MCV RBC AUTO: 95 FL (ref 82–98)
MONOCYTES # BLD AUTO: 0.77 THOUSAND/ÂΜL (ref 0.17–1.22)
MONOCYTES NFR BLD AUTO: 6 % (ref 4–12)
NEUTROPHILS # BLD AUTO: 10.43 THOUSANDS/ÂΜL (ref 1.85–7.62)
NEUTS SEG NFR BLD AUTO: 81 % (ref 43–75)
NRBC BLD AUTO-RTO: 0 /100 WBCS
PLATELET # BLD AUTO: 241 THOUSANDS/UL (ref 149–390)
PMV BLD AUTO: 10.3 FL (ref 8.9–12.7)
POTASSIUM SERPL-SCNC: 3.3 MMOL/L (ref 3.5–5.3)
RBC # BLD AUTO: 4.81 MILLION/UL (ref 3.88–5.62)
SODIUM SERPL-SCNC: 140 MMOL/L (ref 135–147)
WBC # BLD AUTO: 12.87 THOUSAND/UL (ref 4.31–10.16)

## 2025-06-22 PROCEDURE — 80048 BASIC METABOLIC PNL TOTAL CA: CPT

## 2025-06-22 PROCEDURE — 99024 POSTOP FOLLOW-UP VISIT: CPT | Performed by: SURGERY

## 2025-06-22 PROCEDURE — 83735 ASSAY OF MAGNESIUM: CPT

## 2025-06-22 PROCEDURE — 85025 COMPLETE CBC W/AUTO DIFF WBC: CPT

## 2025-06-22 RX ORDER — POTASSIUM CHLORIDE 14.9 MG/ML
20 INJECTION INTRAVENOUS
Status: COMPLETED | OUTPATIENT
Start: 2025-06-22 | End: 2025-06-22

## 2025-06-22 RX ADMIN — SODIUM CHLORIDE 125 ML/HR: 0.9 INJECTION, SOLUTION INTRAVENOUS at 09:34

## 2025-06-22 RX ADMIN — POTASSIUM CHLORIDE 20 MEQ: 14.9 INJECTION, SOLUTION INTRAVENOUS at 11:45

## 2025-06-22 RX ADMIN — SODIUM CHLORIDE 125 ML/HR: 0.9 INJECTION, SOLUTION INTRAVENOUS at 01:12

## 2025-06-22 RX ADMIN — METRONIDAZOLE 500 MG: 500 INJECTION, SOLUTION INTRAVENOUS at 18:32

## 2025-06-22 RX ADMIN — HEPARIN SODIUM 5000 UNITS: 5000 INJECTION INTRAVENOUS; SUBCUTANEOUS at 15:13

## 2025-06-22 RX ADMIN — HEPARIN SODIUM 5000 UNITS: 5000 INJECTION INTRAVENOUS; SUBCUTANEOUS at 04:38

## 2025-06-22 RX ADMIN — POTASSIUM CHLORIDE 20 MEQ: 14.9 INJECTION, SOLUTION INTRAVENOUS at 09:26

## 2025-06-22 RX ADMIN — CEFTRIAXONE 2000 MG: 2 INJECTION, SOLUTION INTRAVENOUS at 17:40

## 2025-06-22 RX ADMIN — PANTOPRAZOLE SODIUM 40 MG: 40 INJECTION, POWDER, FOR SOLUTION INTRAVENOUS at 08:03

## 2025-06-22 RX ADMIN — HEPARIN SODIUM 5000 UNITS: 5000 INJECTION INTRAVENOUS; SUBCUTANEOUS at 21:33

## 2025-06-22 RX ADMIN — PANTOPRAZOLE SODIUM 40 MG: 40 INJECTION, POWDER, FOR SOLUTION INTRAVENOUS at 21:33

## 2025-06-22 RX ADMIN — NICOTINE 1 PATCH: 14 PATCH, EXTENDED RELEASE TRANSDERMAL at 08:08

## 2025-06-22 RX ADMIN — SODIUM CHLORIDE 125 ML/HR: 0.9 INJECTION, SOLUTION INTRAVENOUS at 17:41

## 2025-06-22 RX ADMIN — METRONIDAZOLE 500 MG: 500 INJECTION, SOLUTION INTRAVENOUS at 01:12

## 2025-06-22 RX ADMIN — METRONIDAZOLE 500 MG: 500 INJECTION, SOLUTION INTRAVENOUS at 08:03

## 2025-06-22 NOTE — PLAN OF CARE
Problem: PAIN - ADULT  Goal: Verbalizes/displays adequate comfort level or baseline comfort level  Description: Interventions:  - Encourage patient to monitor pain and request assistance  - Assess pain using appropriate pain scale  - Administer analgesics as ordered based on type and severity of pain and evaluate response  - Implement non-pharmacological measures as appropriate and evaluate response  - Consider cultural and social influences on pain and pain management  - Notify physician/advanced practitioner if interventions unsuccessful or patient reports new pain  - Educate patient/family on pain management process including their role and importance of  reporting pain   - Provide non-pharmacologic/complimentary pain relief interventions  Outcome: Progressing     Problem: INFECTION - ADULT  Goal: Absence or prevention of progression during hospitalization  Description: INTERVENTIONS:  - Assess and monitor for signs and symptoms of infection  - Monitor lab/diagnostic results  - Monitor all insertion sites, i.e. indwelling lines, tubes, and drains  - Monitor endotracheal if appropriate and nasal secretions for changes in amount and color  - [place] appropriate cooling/warming therapies per order  - Administer medications as ordered  - Instruct and encourage patient and family to use good hand hygiene technique  - Identify and instruct in appropriate isolation precautions for identified infection/condition  Outcome: Progressing  Goal: Absence of fever/infection during neutropenic period  Description: INTERVENTIONS:  - Monitor WBC  - Perform strict hand hygiene  - Limit to healthy visitors only  - No plants, dried, fresh or silk flowers with bardales in patient room  Outcome: Progressing     Problem: SAFETY ADULT  Goal: Patient will remain free of falls  Description: INTERVENTIONS:  - Educate patient/family on patient safety including physical limitations  - Instruct patient to call for assistance with activity   -  Consider consulting OT/PT to assist with strengthening/mobility based on AM PAC & JH-HLM score  - Consult OT/PT to assist with strengthening/mobility   - Keep Call bell within reach  - Keep bed low and locked with side rails adjusted as appropriate  - Keep care items and personal belongings within reach  - Initiate and maintain comfort rounds  - Make Fall Risk Sign visible to staff  - Offer Toileting every  Hours, in advance of need  - Initiate/Maintain alarm  - Obtain necessary fall risk management equipment:   - Apply yellow socks and bracelet for high fall risk patients  - Consider moving patient to room near nurses station  Outcome: Progressing  Goal: Maintain or return to baseline ADL function  Description: INTERVENTIONS:  -  Assess patient's ability to carry out ADLs; assess patient's baseline for ADL function and identify physical deficits which impact ability to perform ADLs (bathing, care of mouth/teeth, toileting, grooming, dressing, etc.)  - Assess/evaluate cause of self-care deficits   - Assess range of motion  - Assess patient's mobility; develop plan if impaired  - Assess patient's need for assistive devices and provide as appropriate  - Encourage maximum independence but intervene and supervise when necessary  - Involve family in performance of ADLs  - Assess for home care needs following discharge   - Consider OT consult to assist with ADL evaluation and planning for discharge  - Provide patient education as appropriate  - Monitor functional capacity and physical performance, use of AM PAC & JH-HLM   - Monitor gait, balance and fatigue with ambulation    Outcome: Progressing  Goal: Maintains/Returns to pre admission functional level  Description: INTERVENTIONS:  - Perform AM-PAC 6 Click Basic Mobility/ Daily Activity assessment daily.  - Set and communicate daily mobility goal to care team and patient/family/caregiver.   - Collaborate with rehabilitation services on mobility goals if consulted  -  Perform Range of Motion  times a day.  - Reposition patient every  hours.  - Dangle patient  times a day  - Stand patient  times a day  - Ambulate patient  times a day  - Out of bed to chair  times a day   - Out of bed for meals  times a day  - Out of bed for toileting  - Record patient progress and toleration of activity level   Outcome: Progressing

## 2025-06-22 NOTE — ASSESSMENT & PLAN NOTE
Postop day #2 status post exploratory laparotomy, Omar patch repair of perforated pyloric ulcer.  Remains NPO, NG in place.  Pain controlled.    WBC improved 12.87 (15.31)  Hgb stable 14.8 15.6)  BMP K 3.3, Cr 0.65  Mg 2.1    UOP 1075 ml  Surgical drain 435 mL ss fluid yesterday, just emptied another 50 ml.  NG tube output 900 mL    Abdomen nondistended, positive bowel sounds are heard.  Midline abdominal scar, dressing clean and dry, appropriate TTP.  Dressing was changed to inspect incision and drain site, staples intact, appears clean without drainage.    VSS, afebrile.

## 2025-06-22 NOTE — PLAN OF CARE
Problem: PAIN - ADULT  Goal: Verbalizes/displays adequate comfort level or baseline comfort level  Description: Interventions:  - Encourage patient to monitor pain and request assistance  - Assess pain using appropriate pain scale  - Administer analgesics as ordered based on type and severity of pain and evaluate response  - Implement non-pharmacological measures as appropriate and evaluate response  - Consider cultural and social influences on pain and pain management  - Notify physician/advanced practitioner if interventions unsuccessful or patient reports new pain  - Educate patient/family on pain management process including their role and importance of  reporting pain   - Provide non-pharmacologic/complimentary pain relief interventions  Outcome: Progressing     Problem: INFECTION - ADULT  Goal: Absence or prevention of progression during hospitalization  Description: INTERVENTIONS:  - Assess and monitor for signs and symptoms of infection  - Monitor lab/diagnostic results  - Monitor all insertion sites, i.e. indwelling lines, tubes, and drains  - Monitor endotracheal if appropriate and nasal secretions for changes in amount and color  - Harrison appropriate cooling/warming therapies per order  - Administer medications as ordered  - Instruct and encourage patient and family to use good hand hygiene technique  - Identify and instruct in appropriate isolation precautions for identified infection/condition  Outcome: Progressing  Goal: Absence of fever/infection during neutropenic period  Description: INTERVENTIONS:  - Monitor WBC  - Perform strict hand hygiene  - Limit to healthy visitors only  - No plants, dried, fresh or silk flowers with bardales in patient room  Outcome: Progressing

## 2025-06-22 NOTE — PROGRESS NOTES
Progress Note - Surgery-General   Name: Mykel Gonzales 44 y.o. male I MRN: [id number]  Unit/Bed#: -[facility] I Date of Admission: 6/20/2025   Date of Service: 6/22/2025 I Hospital Day: 2     Assessment & Plan  Pneumoperitoneum    Postop day #2 status post exploratory laparotomy, Omar patch repair of perforated pyloric ulcer.  Remains NPO, NG in place.  Pain controlled.    WBC improved 12.87 (15.31)  Hgb stable 14.8 15.6)  BMP K 3.3, Cr 0.65  Mg 2.1    UOP 1075 ml  Surgical drain 435 mL ss fluid yesterday, just emptied another 50 ml.  NG tube output 900 mL    Abdomen nondistended, positive bowel sounds are heard.  Midline abdominal scar, dressing clean and dry, appropriate TTP.  Dressing was changed to inspect incision and drain site, staples intact, appears clean without drainage.    VSS, afebrile.    Perforated viscus    44-year-old male seen this morning, second postop day status post exploratory laparotomy.  Presented with pneumoperitoneum.  He underwent exploratory laparotomy, found to have perforated pyloric ulcer and Omar patch repair was performed.  He is Georgian speaking but does comprehend English, his wife is here with him.  Pain minimal 1-2.  Claims some flatus.  Nicoderm patch applied.    PLAN:    Management discussed via telephone this morning with Dr. Devonte Canela who will examine the patient later today.  Maintain IV fluids for hydration  Replete K now IVPB, 40 mEq  DC Man, urinary retention protocol  Out of bed to chair today  Analgesics and antiemetics as ordered as needed.  Scheduled IV Ofirmev every 6 hours.  Keep NG tube to low wall suction,  Record I&O's by nursing  Continue IV PPI, Protonix 40 mg every 12 hours  Continue IV ceftriaxone and metronidazole  Repeat a.m. labs, CBC, BMP, magnesium  Surgical drain to remain  NicoDerm CQ 14 mg topical patch daily  DVT prophylaxis ongoing, sq heparin and SCDs  Patient encouraged use of incentive spirometry 10 times hourly while awake  throughout the day  OOB chair  PT/OT  Ambulate in [facility], may clamp NG tube to ambulate  Upper GI swallow study early this week to ensure no leak before advancing diet.  Tobacco abuse  Topical nicotine replacement patch.  Alcohol abuse  No signs of any alcohol withdrawal symptoms.    Please contact the SecureChat role for the Surgery-General service with any questions/concerns.    Subjective   No overnight events.  Dry mouth.  Minimal incision pain, seems to be controlled with current meds.  Claims passing small amount of flatus.  No nausea.  NG tube to low wall suction.  Surgical drain still with significant serosanguineous drainage.  No fever, denies chills.  Wife is present with him here at this time.  He does comprehend English.  Urinary catheter remains.    Scheduled Meds:  Current Facility-Administered Medications   Medication Dose Route Frequency Provider Last Rate    acetaminophen  1,000 mg Intravenous Q6H PRN Janie Tate, DO      cefTRIAXone  2,000 mg Intravenous Q24H Janie Tate, DO 2,000 mg (06/21/25 1801)    heparin (porcine)  5,000 Units Subcutaneous Q8H [facility] Janie Tate, DO      HYDROmorphone  0.2 mg Intravenous Q4H PRN Janie Tate, DO      HYDROmorphone  0.5 mg Intravenous Q3H PRN Janie Tate DO      metroNIDAZOLE  500 mg Intravenous Q8H Janie Tate,  mg (06/22/25 0803)    nicotine  1 patch Transdermal Daily Janie Tate DO      ondansetron  4 mg Intravenous Q6H PRN Janie Tate, DO      pantoprazole  40 mg Intravenous Q12H [facility] Janie Tate, DO      phenol  1 spray Mouth/Throat Q2H PRN Devonte Canela, DO      sodium chloride  125 mL/hr Intravenous Continuous Janie Tate  mL/hr (06/22/25 0112)     Continuous Infusions:sodium chloride, 125 mL/hr, Last Rate: 125 mL/hr (06/22/25 0112)      PRN Meds:.  acetaminophen    HYDROmorphone    HYDROmorphone    ondansetron    phenol      Objective :  Temp:  [96.4 °F (35.8 °C)-97 °F  "(36.1 °C)] 97 °F (36.1 °C)  HR:  [83-96] 96  BP: (119-137)/(77-86) 137/86  Resp:  [16-20] 18  SpO2:  [93 %-96 %] 95 %  O2 Device: None (Room air)    I/O         06/20 0701  06/21 0700 06/21 0701  06/22 0700 06/22 0701  06/23 0700    P.O.  0     I.V. (mL/kg) 2583.3 (37.2)      IV Piggyback 1250 150     Total Intake(mL/kg) 3833.3 (55.2) 150 (2.2)     Urine (mL/kg/hr) 1255 1075 (0.6)     Emesis/NG output 1050 900     Drains 180 435 50    Other 750      Total Output 3235 2410 50    Net +598.3 -2260 -50                 Lines/Drains/Airways       Active Status       Name Placement date Placement time Site Days    Closed/Suction Drain RLQ Bulb 19 Fr. 06/20/25  1322  RLQ  1    NG/OG/Enteral Tube Nasogastric 18 Fr Right nare 06/20/25  1346  Right nare  1    Urethral Catheter Non-latex 16 Fr. 06/20/25  1230  Non-latex  1                  Physical Exam    /86   Pulse 96   Temp (!) 97 °F (36.1 °C)   Resp 18   Ht 5' 8\" (1.727 m)   Wt 69.4 kg (153 lb)   SpO2 95%   BMI 23.26 kg/m²     HENT:      Head: Normocephalic.      Nose: Nose normal.      Comments: NG tube.     Mouth/Throat: Appears dry.  Eyes:      Conjunctiva/sclera: Sclera white OU.  Cardiovascular:      Rate and Rhythm: RRR     Heart sounds: Normal heart sounds. No murmur heard.  Pulmonary:      Effort: Pulmonary effort is normal. No respiratory distress.      Breath sounds: Normal breath sounds. No wheeze.  Abdominal:      General: Mildly distended.     Palpations: Abdomen is soft. There is no mass.      Tenderness: There is incisional abdominal tenderness. There is no right CVA tenderness, left CVA tenderness, guarding or rebound.      Hernia: No hernia is present.      Comments: Midline abdominal dressing intact without strikethrough drainage, incisional TTP.  Surgical drain in place, about 50 mL SS fluid in grenade was emptied.  Drain output yesterday 435.ml.  Dressing was changed at bedside today, staple line clean and dry, appears without erythema, no " "fluctuance or drainage.  Replaced with sterile 4 x 4's and tape.  Musculoskeletal:         General: No swelling or tenderness.      Cervical back: Normal range of motion and neck supple.      Right lower leg: No edema.      Left lower leg: No edema.   Skin:     Coloration: No pallor.     Findings: No bruising, erythema, lesion or rash.   Neurological:      General: No focal deficit present.      Mental Status: A, Ox3.      Cranial Nerves: No cranial nerve deficit.      Motor: Ambulation not observed.  Psychiatric:         Mood and Affect: Mood normal.         Thought Content: Thought content normal.         Judgment: Judgment normal.        Lab Results: I have reviewed the following results:  Recent Labs     06/20/25  0922 06/20/25  1016 06/20/25  1653 06/22/25  0436   WBC 21.73*  --    < > 12.87*   HGB 18.1*  --    < > 14.8   HCT 55.4*  --    < > 45.7     --    < > 241   SODIUM 137  --    < > 140   K 3.8  --    < > 3.3*     --    < > 109*   CO2 21  --    < > 24   BUN 13  --    < > 16   CREATININE 0.76  --    < > 0.65   GLUC 122  --    < > 86   MG  --   --   --  2.1   AST 26  --   --   --    ALT 28  --   --   --    ALB 4.0  --   --   --    TBILI 1.03*  --   --   --    ALKPHOS 62  --   --   --    LACTICACID  --  1.3  --   --     < > = values in this interval not displayed.       Imaging Results Review: No pertinent imaging studies reviewed.  Other Study Results Review: No additional pertinent studies reviewed.    VTE Pharmacologic Prophylaxis: Heparin  VTE Mechanical Prophylaxis: sequential compression device    Casper Cevallos PA-C    **Please Note: Portions of the record may have been created using voice recognition software.  Occasional wrong word or \"sound a like\" substitutions may have occurred due to the inherent limitations of voice recognition software.  Read the chart carefully and recognize, using context, where substitutions have occurred.**      "

## 2025-06-22 NOTE — CASE MANAGEMENT
Case Management Assessment & Discharge Planning Note    Patient name Mykel Gonzales  Location /-01 MRN [id number]  : 1980 Date 2025       Current Admission Date: 2025  Current Admission Diagnosis:Pneumoperitoneum   Patient Active Problem List    Diagnosis Date Noted    Tobacco abuse 2025    Alcohol abuse 2025    Pneumoperitoneum 2025    Perforated viscus 2025      LOS (days): 2  Geometric Mean LOS (GMLOS) (days):   Days to GMLOS:     OBJECTIVE:    Risk of Unplanned Readmission Score: 7.72         Current admission status: Inpatient  Referral Reason: No Insurance    Preferred Pharmacy:   [id number] - [place] - [address] [facility]  [address] [place] [address]  Phone: [phone] Fax: [phone]    Primary Care Provider: No primary care provider on file.    Primary Insurance:   Secondary Insurance:     ASSESSMENT:  Active Health Care Proxies    There are no active Health Care Proxies on file.       Advance Directives  Does patient have a Health Care POA?: No  Was patient offered paperwork?: Yes  Does patient currently have a Health Care decision maker?: Yes, please see Health Care Proxy section  Does patient have Advance Directives?: No  Was patient offered paperwork?: Yes  Primary Contact: Boyd Renteria         Readmission Root Cause  30 Day Readmission: No    Patient Information  Admitted from:: Home  Mental Status: Alert  During Assessment patient was accompanied by: Spouse  Assessment information provided by:: Son  Primary Caregiver: Self  Support Systems: Spouse/significant other, Son, Daughter  County of Residence: [facility]  What city do you live in?: [place]  Home entry access options. Select all that apply.: Stairs  Number of steps to enter home.: 3  Do the steps have railings?: Yes  Type of Current Residence: [address] home  Upon entering residence, is there a bedroom on the main floor (no further steps)?: No  A  bedroom is located on the following floor levels of residence (select all that apply):: 2nd Floor  Upon entering residence, is there a bathroom on the main floor (no further steps)?: No  Indicate which floors of current residence have a bathroom (select all the apply):: 2nd Floor  Number of steps to 2nd floor from main floor: One Flight  Living Arrangements: Lives w/ Spouse/significant other, Lives w/ Children  Is patient a ?: No ([place] here in [place] - was in [place])    Activities of Daily Living Prior to Admission  Functional Status: Independent  Completes ADLs independently?: Yes  Ambulates independently?: Yes  Does patient use assisted devices?: No  Does patient currently own DME?: No  Does patient have a history of Outpatient Therapy (PT/OT)?: No  Does the patient have a history of Short-Term Rehab?: No  Does patient have a history of HHC?: No  Does patient currently have HHC?: No         Patient Information Continued  Income Source: Employed ([employer])  Does patient have prescription coverage?: No  Can the patient afford their medications and any related supplies (such as glucometers or test strips)?: No  Does patient receive dialysis treatments?: No  Does patient have a history of substance abuse?: Yes  Historical substance use preference: Alcohol/ETOH  History of Withdrawal Symptoms: Denies past symptoms  Is patient currently in treatment for substance abuse?: No. Patient declined treatment information.  Does patient have a history of Mental Health Diagnosis?: No         Means of Transportation  Means of Transport to [facility]:: Family transport, Drives Self, Friends          DISCHARGE DETAILS:    Discharge planning discussed with:: patient, spouse, and son  Freedom of Choice: Yes  Comments - Freedom of Choice: aware of pending therapy assessments and CM to follow up after - CM discussed service cordination may be difficult without information. Patient did give CM permission to apply  for PATHS  CM contacted family/caregiver?: Yes  Were Treatment Team discharge recommendations reviewed with patient/caregiver?: Yes  Did patient/caregiver verbalize understanding of patient care needs?: Yes  Were patient/caregiver advised of the risks associated with not following Treatment Team discharge recommendations?: Yes    Contacts  Patient Contacts: Dexter (SON)  Relationship to Patient:: Family  Contact Method: Phone  Phone Number: [phone]  Reason/Outcome: Continuity of Care, Discharge Planning         CM met with patient's son by phone,baseline information  was obtained. CM discussed the role of CM in helping the patient develop a discharge plan and assist the patient in carry out their plan.  Patient was IPTA, uses no DME at baseline for ambulation, and has no hx of rehab/therapy.   Patient works FT and drives.   Patient lives with spouse and two CHRN in a 2 story home - 3 LINN and bed/bath on the 2nd floor.   CM reviewed DC planning and pending therapy assessments. CM explained limitations with coordination of care without insurance but can follow up when we get a better idea of patient's needs.  Patient's PCP is Adriel Mitchell - they care for the entire family and they typically pay out of pocket.     CM to follow patient's care and discharge needs.

## 2025-06-22 NOTE — CASE MANAGEMENT
Case Management Assessment & Discharge Planning Note    Patient name Mykel Gonzales  Location /-01 MRN [id number]  : 1980 Date 2025       Current Admission Date: 2025  Current Admission Diagnosis:Pneumoperitoneum   Patient Active Problem List    Diagnosis Date Noted    Tobacco abuse 2025    Alcohol abuse 2025    Pneumoperitoneum 2025    Perforated viscus 2025      LOS (days): 2  Geometric Mean LOS (GMLOS) (days):   Days to GMLOS:     OBJECTIVE:    Risk of Unplanned Readmission Score: 7.72         Current admission status: Inpatient  Referral Reason: No Insurance    Preferred Pharmacy:   [facility] - [place] - [address] [facility]  [address] [place] [address]  Phone: [phone] Fax: [phone]    Primary Care Provider: No primary care provider on file.    Primary Insurance:   Secondary Insurance:     ASSESSMENT:  Active Health Care Proxies    There are no active Health Care Proxies on file.     Readmission Root Cause  30 Day Readmission: No    Patient Information  Admitted from:: Home  Mental Status: Alert  During Assessment patient was accompanied by: Spouse  Assessment information provided by:: Son (patient deferred assessment to his son as his son speaks more fluent english)      DISCHARGE DETAILS:    CM attempted to meet with patient at the bedside,CM discussed the role of CM in helping the patient develop a discharge plan and assist the patient in carry out their plan. Patient said he does not understand english well and asked CM to come back when son present. CM asked if they can call him.  His phone is Christopher [phone].  CM pointed out that he has no insurance, gave CM permission to refer to PATHS.    CM call to son -  for call back.    CM placed referral to financial counselors to apply for [employer] on patient's behalf and [facility]    CM to follow patient's care and discharge needs.

## 2025-06-22 NOTE — ANESTHESIA POSTPROCEDURE EVALUATION
Post-Op Assessment Note    CV Status:  Stable    Pain management: adequate       Mental Status:  Alert   Hydration Status:  Stable   PONV Controlled:  None   Airway Patency:  Patent  Two or more mitigation strategies used for obstructive sleep apnea   Post Op Vitals Reviewed: Yes    No anethesia notable event occurred.    Staff: Anesthesiologist           Last Filed PACU Vitals:  Vitals Value Taken Time   Temp 97.2 °F (36.2 °C) 06/20/25 16:07   Pulse 85 06/20/25 16:09   /87 06/20/25 16:07   Resp 13 06/20/25 16:09   SpO2 96 % 06/20/25 16:09   Vitals shown include unfiled device data.    Modified Ronni:     Vitals Value Taken Time   Activity 2 06/20/25 16:07   Respiration 2 06/20/25 16:07   Circulation 2 06/20/25 16:07   Consciousness 2 06/20/25 16:07   Oxygen Saturation 2 06/20/25 16:07     Modified Ronni Score: 10

## 2025-06-22 NOTE — ASSESSMENT & PLAN NOTE
44-year-old male seen this morning, second postop day status post exploratory laparotomy.  Presented with pneumoperitoneum.  He underwent exploratory laparotomy, found to have perforated pyloric ulcer and Omar patch repair was performed.  He is Albanian speaking but does comprehend English, his wife is here with him.  Pain minimal 1-2.  Claims some flatus.  Nicoderm patch applied.    PLAN:    Management discussed via telephone this morning with Dr. Devonte Canela who will examine the patient later today.  Maintain IV fluids for hydration  Replete K now IVPB, 40 mEq  DC Man, urinary retention protocol  Out of bed to chair today  Analgesics and antiemetics as ordered as needed.  Scheduled IV Ofirmev every 6 hours.  Keep NG tube to low wall suction,  Record I&O's by nursing  Continue IV PPI, Protonix 40 mg every 12 hours  Continue IV ceftriaxone and metronidazole  Repeat a.m. labs, CBC, BMP, magnesium  Surgical drain to remain  NicoDerm CQ 14 mg topical patch daily  DVT prophylaxis ongoing, sq heparin and SCDs  Patient encouraged use of incentive spirometry 10 times hourly while awake throughout the day  OOB chair  PT/OT  Ambulate in hallway, may clamp NG tube to ambulate  Upper GI swallow study early this week to ensure no leak before advancing diet.

## 2025-06-23 ENCOUNTER — APPOINTMENT (INPATIENT)
Dept: RADIOLOGY | Facility: HOSPITAL | Age: 45
End: 2025-06-23
Payer: COMMERCIAL

## 2025-06-23 LAB
ANION GAP SERPL CALCULATED.3IONS-SCNC: 10 MMOL/L (ref 4–13)
BACTERIA SPEC ANAEROBE CULT: ABNORMAL
BASOPHILS # BLD AUTO: 0.05 THOUSANDS/ÂΜL (ref 0–0.1)
BASOPHILS NFR BLD AUTO: 0 % (ref 0–1)
BUN SERPL-MCNC: 15 MG/DL (ref 5–25)
CALCIUM SERPL-MCNC: 8 MG/DL (ref 8.4–10.2)
CHLORIDE SERPL-SCNC: 112 MMOL/L (ref 96–108)
CO2 SERPL-SCNC: 20 MMOL/L (ref 21–32)
CREAT SERPL-MCNC: 0.65 MG/DL (ref 0.6–1.3)
EOSINOPHIL # BLD AUTO: 0.4 THOUSAND/ÂΜL (ref 0–0.61)
EOSINOPHIL NFR BLD AUTO: 3 % (ref 0–6)
ERYTHROCYTE [DISTWIDTH] IN BLOOD BY AUTOMATED COUNT: 13.1 % (ref 11.6–15.1)
GFR SERPL CREATININE-BSD FRML MDRD: 119 ML/MIN/1.73SQ M
GLUCOSE SERPL-MCNC: 74 MG/DL (ref 65–140)
HCT VFR BLD AUTO: 45.2 % (ref 36.5–49.3)
HGB BLD-MCNC: 14.9 G/DL (ref 12–17)
IMM GRANULOCYTES # BLD AUTO: 0.06 THOUSAND/UL (ref 0–0.2)
IMM GRANULOCYTES NFR BLD AUTO: 1 % (ref 0–2)
LYMPHOCYTES # BLD AUTO: 1.48 THOUSANDS/ÂΜL (ref 0.6–4.47)
LYMPHOCYTES NFR BLD AUTO: 11 % (ref 14–44)
MAGNESIUM SERPL-MCNC: 2 MG/DL (ref 1.9–2.7)
MCH RBC QN AUTO: 31.2 PG (ref 26.8–34.3)
MCHC RBC AUTO-ENTMCNC: 33 G/DL (ref 31.4–37.4)
MCV RBC AUTO: 95 FL (ref 82–98)
MONOCYTES # BLD AUTO: 0.74 THOUSAND/ÂΜL (ref 0.17–1.22)
MONOCYTES NFR BLD AUTO: 6 % (ref 4–12)
NEUTROPHILS # BLD AUTO: 10.26 THOUSANDS/ÂΜL (ref 1.85–7.62)
NEUTS SEG NFR BLD AUTO: 79 % (ref 43–75)
NRBC BLD AUTO-RTO: 0 /100 WBCS
PLATELET # BLD AUTO: 283 THOUSANDS/UL (ref 149–390)
PMV BLD AUTO: 9.5 FL (ref 8.9–12.7)
POTASSIUM SERPL-SCNC: 3.6 MMOL/L (ref 3.5–5.3)
RBC # BLD AUTO: 4.77 MILLION/UL (ref 3.88–5.62)
SODIUM SERPL-SCNC: 142 MMOL/L (ref 135–147)
WBC # BLD AUTO: 12.99 THOUSAND/UL (ref 4.31–10.16)

## 2025-06-23 PROCEDURE — 83735 ASSAY OF MAGNESIUM: CPT

## 2025-06-23 PROCEDURE — 74240 X-RAY XM UPR GI TRC 1CNTRST: CPT

## 2025-06-23 PROCEDURE — 85025 COMPLETE CBC W/AUTO DIFF WBC: CPT

## 2025-06-23 PROCEDURE — 80048 BASIC METABOLIC PNL TOTAL CA: CPT

## 2025-06-23 RX ADMIN — HEPARIN SODIUM 5000 UNITS: 5000 INJECTION INTRAVENOUS; SUBCUTANEOUS at 21:08

## 2025-06-23 RX ADMIN — METRONIDAZOLE 500 MG: 500 INJECTION, SOLUTION INTRAVENOUS at 02:45

## 2025-06-23 RX ADMIN — METRONIDAZOLE 500 MG: 500 INJECTION, SOLUTION INTRAVENOUS at 18:38

## 2025-06-23 RX ADMIN — PANTOPRAZOLE SODIUM 40 MG: 40 INJECTION, POWDER, FOR SOLUTION INTRAVENOUS at 09:43

## 2025-06-23 RX ADMIN — IOHEXOL 50 ML: 350 INJECTION, SOLUTION INTRAVENOUS at 11:40

## 2025-06-23 RX ADMIN — HEPARIN SODIUM 5000 UNITS: 5000 INJECTION INTRAVENOUS; SUBCUTANEOUS at 14:41

## 2025-06-23 RX ADMIN — PANTOPRAZOLE SODIUM 40 MG: 40 INJECTION, POWDER, FOR SOLUTION INTRAVENOUS at 21:08

## 2025-06-23 RX ADMIN — METRONIDAZOLE 500 MG: 500 INJECTION, SOLUTION INTRAVENOUS at 09:44

## 2025-06-23 RX ADMIN — CEFTRIAXONE 2000 MG: 2 INJECTION, SOLUTION INTRAVENOUS at 19:16

## 2025-06-23 RX ADMIN — SODIUM CHLORIDE 125 ML/HR: 0.9 INJECTION, SOLUTION INTRAVENOUS at 13:07

## 2025-06-23 RX ADMIN — SODIUM CHLORIDE 125 ML/HR: 0.9 INJECTION, SOLUTION INTRAVENOUS at 22:07

## 2025-06-23 RX ADMIN — SODIUM CHLORIDE 125 ML/HR: 0.9 INJECTION, SOLUTION INTRAVENOUS at 02:44

## 2025-06-23 RX ADMIN — NICOTINE 1 PATCH: 14 PATCH, EXTENDED RELEASE TRANSDERMAL at 09:43

## 2025-06-23 RX ADMIN — HEPARIN SODIUM 5000 UNITS: 5000 INJECTION INTRAVENOUS; SUBCUTANEOUS at 05:55

## 2025-06-23 NOTE — OCCUPATIONAL THERAPY NOTE
Occupational Therapy Screen     Patient Name: Mykel Gonzales  Today's Date: 6/23/2025  Problem List  Active Problems:    Pneumoperitoneum    Perforated viscus    Tobacco abuse    Alcohol abuse            06/23/25 1551   OT Last Visit   OT Visit Date 06/23/25   Note Type   Note type Screen   Additional Comments Independent       Received order for OT consult. Chart reviewed. Spoke with PT, who spoke with RN and patient who reports he is at his PLOF of independent at this time. The pt reported ambulating independently without difficulty. PT Boris observed pt ambulate around nursing unit without AD. Pt reports no concerns with returning home upon discharge. Will D/C OT services at this time as patient is at his PLOF of independent without acute care OT services warranted. Should patient's status change, please re-consult.     Josef Kinney OTR/L

## 2025-06-23 NOTE — PHYSICAL THERAPY NOTE
Physical Therapy Screen        Patient Name: Mykel Gonzales    Today's Date: 6/23/2025     Problem List  Active Problems:    Pneumoperitoneum    Perforated viscus    Tobacco abuse    Alcohol abuse       Past Medical History  Past Medical History[1]     Past Surgical History  Past Surgical History[2]        06/23/25 1344   Note Type   Note type Screen   Additional Comments Independent       Received order for PT consult. Chart reviewed. Spoke with RN. Spoke with patient who reports he is at his PLOF of independent at this time. He reports ambulating independently without difficulty. PT observes pt ambulate around nursing unit without AD. Pt reports no concerns with returning home upon discharge. Will D/C PT services at this time as patient is at his PLOF of independent without acute care PT services warranted. Should patient's status change, please re-consult.        Boris Lui, PT,DPT         [1] No past medical history on file.  [2]   Past Surgical History:  Procedure Laterality Date    LAPAROTOMY N/A 6/20/2025    Procedure: Exploratory laparotomy, Omar patch repair of perforated ulcer of pylorus;  Surgeon: Janie Tate DO;  Location:  MAIN OR;  Service: General

## 2025-06-23 NOTE — ASSESSMENT & PLAN NOTE
Postop day #3 status post exploratory laparotomy, Omar patch repair of perforated pyloric ulcer.      The patient is doing well today.  He denies any significant abdominal pain.  He denies any nausea.  He did have 2 bowel movements.    On examination the patient's abdomen is soft and slightly distended, incision intact with staples, no erythema or drainage, serosanguineous output within the ROBYN    ROBYN drain output 395ml  NG tube output 850, clear    WBC not significantly changed, 12.99 (12.87)  Hgb stable   No significant electrolyte abnormality    Plan:  Will perform upper GI study today to evaluate for any persistent gastric leak  If upper GI study does not show any leak, would remove NG tube and begin to slowly advance diet  Encourage out of bed  Continue IV Rocephin and Flagyl  Continue IV PPI  Will leave ROBYN in place until tolerating a diet  Continue current pain control

## 2025-06-23 NOTE — PROGRESS NOTES
Progress Note - Surgery-General   Name: Mykel Gonzales 44 y.o. male I MRN: [id number]  Unit/Bed#: [facility] I Date of Admission: 6/20/2025   Date of Service: 6/23/2025 I Hospital Day: 3    Assessment & Plan  Pneumoperitoneum    Postop day #3 status post exploratory laparotomy, Omar patch repair of perforated pyloric ulcer.      The patient is doing well today.  He denies any significant abdominal pain.  He denies any nausea.  He did have 2 bowel movements.    On examination the patient's abdomen is soft and slightly distended, incision intact with staples, no erythema or drainage, serosanguineous output within the ROBYN    ROBYN drain output 395ml  NG tube output 850, clear    WBC not significantly changed, 12.99 (12.87)  Hgb stable   No significant electrolyte abnormality    Plan:  Will perform upper GI study today to evaluate for any persistent gastric leak  If upper GI study does not show any leak, would remove NG tube and begin to slowly advance diet  Encourage out of bed  Continue IV Rocephin and Flagyl  Continue IV PPI  Will leave ROBYN in place until tolerating a diet  Continue current pain control    Tobacco abuse  Topical nicotine replacement patch.  Alcohol abuse  No signs of any alcohol withdrawal symptoms.    Please contact the SecureChat role for the Surgery-General service with any questions/concerns.    Subjective   Patient is doing very well today.  He denies any abdominal pain.  He had 2 bowel movements.  He denies any nausea or vomiting.    Objective :  Temp:  [97 °F (36.1 °C)-97.5 °F (36.4 °C)] 97.5 °F (36.4 °C)  HR:  [78-96] 79  BP: (126-143)/(85-94) 143/85  Resp:  [17-20] 20  SpO2:  [96 %-98 %] 97 %  O2 Device: None (Room air)    I/O         06/21 0701  06/22 0700 06/22 0701  06/23 0700 06/23 0701  06/24 0700    P.O. 0      I.V. (mL/kg)  5083.3 (73.2)     IV Piggyback 150      Total Intake(mL/kg) 150 (2.2) 5083.3 (73.2)     Urine (mL/kg/hr) 1075 (0.6) 400 (0.2) 350 (1.3)    Emesis/NG output 900 850      Drains 435 395 60    Other       Stool  0     Total Output 2410 1645 410    Net -2260 +3438.3 -410           Unmeasured Urine Occurrence  1 x     Unmeasured Stool Occurrence  2 x           Lines/Drains/Airways       Active Status       Name Placement date Placement time Site Days    Closed/Suction Drain RLQ Bulb 19 Fr. 06/20/25  1322  RLQ  2    NG/OG/Enteral Tube Nasogastric 18 Fr Right nare 06/20/25  1346  Right nare  2                  Physical Exam  Constitutional:       Appearance: Normal appearance.   HENT:      Head: Normocephalic and atraumatic.      Mouth/Throat:      Mouth: Mucous membranes are moist.     Cardiovascular:      Rate and Rhythm: Normal rate and regular rhythm.   Pulmonary:      Effort: Pulmonary effort is normal.   Abdominal:      General: There is distension (mild).      Palpations: Abdomen is soft.      Tenderness: There is no abdominal tenderness.      Comments: Incision with staples intact and no erythema ir drainage, ROBYN with serous output     Skin:     General: Skin is warm and dry.     Neurological:      General: No focal deficit present.      Mental Status: He is alert and oriented to person, place, and time.           Lab Results: I have reviewed the following results:  Recent Labs     06/23/25  0501   WBC 12.99*   HGB 14.9   HCT 45.2      SODIUM 142   K 3.6   *   CO2 20*   BUN 15   CREATININE 0.65   GLUC 74   MG 2.0       Imaging Results Review: No pertinent imaging studies reviewed.  Other Study Results Review: No additional pertinent studies reviewed.    VTE Pharmacologic Prophylaxis: VTE covered by:  heparin (porcine), Subcutaneous, 5,000 Units at 06/23/25 0520     VTE Mechanical Prophylaxis: sequential compression device

## 2025-06-23 NOTE — CASE MANAGEMENT
Case Management Discharge Planning Note    Patient name Mykel Gonzales  Location /-01 MRN [id number]  : 1980 Date 2025       Current Admission Date: 2025  Current Admission Diagnosis:Pneumoperitoneum   Patient Active Problem List    Diagnosis Date Noted    Tobacco abuse 2025    Alcohol abuse 2025    Pneumoperitoneum 2025    Perforated viscus 2025      LOS (days): 3  Geometric Mean LOS (GMLOS) (days):   Days to GMLOS:     OBJECTIVE:  Risk of Unplanned Readmission Score: 7.94         Current admission status: Inpatient   Preferred Pharmacy:   [id number] - [place], [place] - [address]  [address]  [place] [address]  Phone: [phone] Fax: [phone]    Primary Care Provider: Debby Olmedo MD    Primary Insurance: [employer] SALC[employer]O PENDING  Secondary Insurance:     DISCHARGE DETAILS:     Email received from hospital financial counselors, [employer]- 100% discount, pt was referred to [facility].

## 2025-06-24 LAB
ANION GAP SERPL CALCULATED.3IONS-SCNC: 10 MMOL/L (ref 4–13)
BASOPHILS # BLD AUTO: 0.06 THOUSANDS/ÂΜL (ref 0–0.1)
BASOPHILS NFR BLD AUTO: 1 % (ref 0–1)
BUN SERPL-MCNC: 11 MG/DL (ref 5–25)
CALCIUM SERPL-MCNC: 7.8 MG/DL (ref 8.4–10.2)
CHLORIDE SERPL-SCNC: 111 MMOL/L (ref 96–108)
CO2 SERPL-SCNC: 19 MMOL/L (ref 21–32)
CREAT SERPL-MCNC: 0.58 MG/DL (ref 0.6–1.3)
EOSINOPHIL # BLD AUTO: 0.46 THOUSAND/ÂΜL (ref 0–0.61)
EOSINOPHIL NFR BLD AUTO: 4 % (ref 0–6)
ERYTHROCYTE [DISTWIDTH] IN BLOOD BY AUTOMATED COUNT: 12.8 % (ref 11.6–15.1)
GFR SERPL CREATININE-BSD FRML MDRD: 125 ML/MIN/1.73SQ M
GLUCOSE SERPL-MCNC: 78 MG/DL (ref 65–140)
HCT VFR BLD AUTO: 48 % (ref 36.5–49.3)
HGB BLD-MCNC: 15.8 G/DL (ref 12–17)
IMM GRANULOCYTES # BLD AUTO: 0.06 THOUSAND/UL (ref 0–0.2)
IMM GRANULOCYTES NFR BLD AUTO: 1 % (ref 0–2)
LYMPHOCYTES # BLD AUTO: 1.98 THOUSANDS/ÂΜL (ref 0.6–4.47)
LYMPHOCYTES NFR BLD AUTO: 18 % (ref 14–44)
MCH RBC QN AUTO: 31.4 PG (ref 26.8–34.3)
MCHC RBC AUTO-ENTMCNC: 32.9 G/DL (ref 31.4–37.4)
MCV RBC AUTO: 95 FL (ref 82–98)
MONOCYTES # BLD AUTO: 0.71 THOUSAND/ÂΜL (ref 0.17–1.22)
MONOCYTES NFR BLD AUTO: 6 % (ref 4–12)
NEUTROPHILS # BLD AUTO: 7.95 THOUSANDS/ÂΜL (ref 1.85–7.62)
NEUTS SEG NFR BLD AUTO: 70 % (ref 43–75)
NRBC BLD AUTO-RTO: 0 /100 WBCS
PLATELET # BLD AUTO: 306 THOUSANDS/UL (ref 149–390)
PMV BLD AUTO: 9.5 FL (ref 8.9–12.7)
POTASSIUM SERPL-SCNC: 4.5 MMOL/L (ref 3.5–5.3)
RBC # BLD AUTO: 5.03 MILLION/UL (ref 3.88–5.62)
SODIUM SERPL-SCNC: 140 MMOL/L (ref 135–147)
WBC # BLD AUTO: 11.22 THOUSAND/UL (ref 4.31–10.16)

## 2025-06-24 PROCEDURE — 85025 COMPLETE CBC W/AUTO DIFF WBC: CPT | Performed by: STUDENT IN AN ORGANIZED HEALTH CARE EDUCATION/TRAINING PROGRAM

## 2025-06-24 PROCEDURE — 80048 BASIC METABOLIC PNL TOTAL CA: CPT | Performed by: STUDENT IN AN ORGANIZED HEALTH CARE EDUCATION/TRAINING PROGRAM

## 2025-06-24 RX ADMIN — METRONIDAZOLE 500 MG: 500 INJECTION, SOLUTION INTRAVENOUS at 00:55

## 2025-06-24 RX ADMIN — SODIUM CHLORIDE 125 ML/HR: 0.9 INJECTION, SOLUTION INTRAVENOUS at 13:54

## 2025-06-24 RX ADMIN — METRONIDAZOLE 500 MG: 500 INJECTION, SOLUTION INTRAVENOUS at 17:47

## 2025-06-24 RX ADMIN — SODIUM CHLORIDE 125 ML/HR: 0.9 INJECTION, SOLUTION INTRAVENOUS at 06:26

## 2025-06-24 RX ADMIN — PANTOPRAZOLE SODIUM 40 MG: 40 INJECTION, POWDER, FOR SOLUTION INTRAVENOUS at 09:54

## 2025-06-24 RX ADMIN — NICOTINE 1 PATCH: 14 PATCH, EXTENDED RELEASE TRANSDERMAL at 09:54

## 2025-06-24 RX ADMIN — METRONIDAZOLE 500 MG: 500 INJECTION, SOLUTION INTRAVENOUS at 09:50

## 2025-06-24 RX ADMIN — HEPARIN SODIUM 5000 UNITS: 5000 INJECTION INTRAVENOUS; SUBCUTANEOUS at 21:21

## 2025-06-24 RX ADMIN — CEFTRIAXONE 2000 MG: 2 INJECTION, SOLUTION INTRAVENOUS at 17:02

## 2025-06-24 RX ADMIN — HEPARIN SODIUM 5000 UNITS: 5000 INJECTION INTRAVENOUS; SUBCUTANEOUS at 05:29

## 2025-06-24 RX ADMIN — HEPARIN SODIUM 5000 UNITS: 5000 INJECTION INTRAVENOUS; SUBCUTANEOUS at 13:49

## 2025-06-24 RX ADMIN — PANTOPRAZOLE SODIUM 40 MG: 40 INJECTION, POWDER, FOR SOLUTION INTRAVENOUS at 21:21

## 2025-06-24 NOTE — PLAN OF CARE
Problem: PAIN - ADULT  Goal: Verbalizes/displays adequate comfort level or baseline comfort level  Description: Interventions:  - Encourage patient to monitor pain and request assistance  - Assess pain using appropriate pain scale  - Administer analgesics as ordered based on type and severity of pain and evaluate response  - Implement non-pharmacological measures as appropriate and evaluate response  - Consider cultural and social influences on pain and pain management  - Notify physician/advanced practitioner if interventions unsuccessful or patient reports new pain  - Educate patient/family on pain management process including their role and importance of  reporting pain   - Provide non-pharmacologic/complimentary pain relief interventions  Outcome: Progressing     Problem: INFECTION - ADULT  Goal: Absence or prevention of progression during hospitalization  Description: INTERVENTIONS:  - Assess and monitor for signs and symptoms of infection  - Monitor lab/diagnostic results  - Monitor all insertion sites, i.e. indwelling lines, tubes, and drains  - Monitor endotracheal if appropriate and nasal secretions for changes in amount and color  - [place] appropriate cooling/warming therapies per order  - Administer medications as ordered  - Instruct and encourage patient and family to use good hand hygiene technique  - Identify and instruct in appropriate isolation precautions for identified infection/condition  Outcome: Progressing     Problem: Nutrition/Hydration-ADULT  Goal: Nutrient/Hydration intake appropriate for improving, restoring or maintaining nutritional needs  Description: Monitor and assess patient's nutrition/hydration status for malnutrition. Collaborate with interdisciplinary team and initiate plan and interventions as ordered.  Monitor patient's weight and dietary intake as ordered or per policy. Utilize nutrition screening tool and intervene as necessary. Determine patient's food preferences and  provide high-protein, high-caloric foods as appropriate.     INTERVENTIONS:  - Monitor oral intake, urinary output, labs, and treatment plans  - Assess nutrition and hydration status and recommend course of action  - Evaluate amount of meals eaten  - Assist patient with eating if necessary   - Allow adequate time for meals  - Recommend/ encourage appropriate diets, oral nutritional supplements, and vitamin/mineral supplements  - Order, calculate, and assess calorie counts as needed  - Recommend, monitor, and adjust tube feedings and TPN/PPN based on assessed needs  - Assess need for intravenous fluids  - Provide specific nutrition/hydration education as appropriate  - Include patient/family/caregiver in decisions related to nutrition  Outcome: Progressing     Problem: GASTROINTESTINAL - ADULT  Goal: Minimal or absence of nausea and/or vomiting  Description: INTERVENTIONS:  - Administer IV fluids if ordered to ensure adequate hydration  - Maintain NPO status until nausea and vomiting are resolved  - Nasogastric tube if ordered  - Administer ordered antiemetic medications as needed  - Provide nonpharmacologic comfort measures as appropriate  - Advance diet as tolerated, if ordered  - Consider nutrition services referral to assist patient with adequate nutrition and appropriate food choices  Outcome: Progressing

## 2025-06-24 NOTE — ASSESSMENT & PLAN NOTE
Postop day #4 status post exploratory laparotomy, Omar patch repair of perforated pyloric ulcer.      The patient is doing well today.  He denies any significant abdominal pain.  He denies any nausea.  Would like something to eat. He did have 1 bowel movement.    On examination the patient's abdomen is soft and non distedned, incision intact with staples, no erythema or drainage, serosanguineous output within the ROBYN    ROBYN drain output 300 (395ml)  NG tube removed 06/23, tolerating ice chips    Upper GI 06/23:  -Contrast flows freely from the stomach lumen into the duodenum with no extravasation. Especially no contrast extravasated at the pyloric ulcer patch.     WBC improving 11.22 ( 12.99, 12.87)  Hgb stable   No significant electrolyte abnormality    Plan:  -tolerating ice chips, advance to clears and will advance slowly as tolerated  -Encourage out of bed  -Continue IV Rocephin and Flagyl  -Continue IV PPI  -Will leave ROBYN in place until tolerating a diet  -Continue current pain control  -anticipate d/c home in 24-48 hours pending diet tolerance

## 2025-06-24 NOTE — PROGRESS NOTES
Progress Note - Surgery-General   Name: Mykel Gonzales 44 y.o. male I MRN: [id number]  Unit/Bed#: [facility] I Date of Admission: 6/20/2025   Date of Service: 6/24/2025 I Hospital Day: 4    Assessment & Plan  Pneumoperitoneum    Postop day #4 status post exploratory laparotomy, Omar patch repair of perforated pyloric ulcer.      The patient is doing well today.  He denies any significant abdominal pain.  He denies any nausea.  Would like something to eat. He did have 1 bowel movement.    On examination the patient's abdomen is soft and non distedned, incision intact with staples, no erythema or drainage, serosanguineous output within the ROBYN    ROBYN drain output 300 (395ml)  NG tube removed 06/23, tolerating ice chips    Upper GI 06/23:  -Contrast flows freely from the stomach lumen into the duodenum with no extravasation. Especially no contrast extravasated at the pyloric ulcer patch.     WBC improving 11.22 ( 12.99, 12.87)  Hgb stable   No significant electrolyte abnormality    Plan:  -tolerating ice chips, advance to clears and will advance slowly as tolerated  -Encourage out of bed  -Continue IV Rocephin and Flagyl  -Continue IV PPI  -Will leave ROBYN in place until tolerating a diet  -Continue current pain control  -anticipate d/c home in 24-48 hours pending diet tolerance    Tobacco abuse  Topical nicotine replacement patch.  Alcohol abuse  No signs of any alcohol withdrawal symptoms.    Please contact the SecureChat role for the Surgery-General service with any questions/concerns.    Subjective   Patient is seen in bed. Denies any complaints and says he is feeling well and is hoping to eat something today. Denies any f/c/n/v/abd pain. +Bm without any blood. Ambulating without any issues and urinating without any issues. Denies any chest pain or shortness of breath.     Objective :  Temp:  [96.6 °F (35.9 °C)-96.8 °F (36 °C)] 96.6 °F (35.9 °C)  HR:  [60-79] 60  BP: (140-151)/(83-92) 151/88  Resp:  [16-18]  18  SpO2:  [95 %-98 %] 98 %  O2 Device: None (Room air)    I/O         06/22 0701  06/23 0700 06/23 0701  06/24 0700 06/24 0701  06/25 0700    P.O.       I.V. (mL/kg) 5083.3 (73.2) 4554.2 (65.6)     IV Piggyback       Total Intake(mL/kg) 5083.3 (73.2) 4554.2 (65.6)     Urine (mL/kg/hr) 400 (0.2) 900 (0.5)     Emesis/NG output 850 300     Drains 395 300     Stool 0 0     Total Output 1645 1500     Net +3438.3 +3054.2            Unmeasured Urine Occurrence 1 x      Unmeasured Stool Occurrence 2 x 1 x           Lines/Drains/Airways       Active Status       Name Placement date Placement time Site Days    Closed/Suction Drain RLQ Bulb 19 Fr. 06/20/25  1322  RLQ  3                  Physical Exam  Vitals reviewed.   Constitutional:       General: He is not in acute distress.     Appearance: He is not ill-appearing, toxic-appearing or diaphoretic.   HENT:      Head: Normocephalic and atraumatic.      Mouth/Throat:      Mouth: Mucous membranes are moist.      Pharynx: No oropharyngeal exudate.     Eyes:      General: No scleral icterus.      Cardiovascular:      Rate and Rhythm: Normal rate.   Pulmonary:      Effort: Pulmonary effort is normal. No respiratory distress.   Abdominal:      General: There is no distension.      Palpations: Abdomen is soft. There is no mass.      Tenderness: There is no abdominal tenderness. There is no guarding or rebound.      Hernia: No hernia is present.      Comments: Abd is soft, nontender, non distended with incision well approximated with staples without any erythema or drainage. Right sided ROBYN with serosang drainage in bulb and tiny amount of drainage on dressing without erythema at insertion site. Dressing replaced.      Musculoskeletal:         General: Normal range of motion.     Skin:     General: Skin is warm and dry.      Coloration: Skin is not jaundiced.     Neurological:      General: No focal deficit present.      Mental Status: He is alert and oriented to person, place, and  time.     Psychiatric:         Mood and Affect: Mood normal.           Lab Results: I have reviewed the following results:  Recent Labs     06/23/25  0501 06/24/25  0529   WBC 12.99* 11.22*   HGB 14.9 15.8   HCT 45.2 48.0    306   SODIUM 142 140   K 3.6 4.5   * 111*   CO2 20* 19*   BUN 15 11   CREATININE 0.65 0.58*   GLUC 74 78   MG 2.0  --        Imaging Results Review: No pertinent imaging studies reviewed.  Other Study Results Review: No additional pertinent studies reviewed.    VTE Pharmacologic Prophylaxis: VTE covered by:  heparin (porcine), Subcutaneous, 5,000 Units at 06/24/25 0529     VTE Mechanical Prophylaxis: sequential compression device

## 2025-06-24 NOTE — PLAN OF CARE
Problem: PAIN - ADULT  Goal: Verbalizes/displays adequate comfort level or baseline comfort level  Description: Interventions:  - Encourage patient to monitor pain and request assistance  - Assess pain using appropriate pain scale  - Administer analgesics as ordered based on type and severity of pain and evaluate response  - Implement non-pharmacological measures as appropriate and evaluate response  - Consider cultural and social influences on pain and pain management  - Notify physician/advanced practitioner if interventions unsuccessful or patient reports new pain  - Educate patient/family on pain management process including their role and importance of  reporting pain   - Provide non-pharmacologic/complimentary pain relief interventions  Outcome: Progressing     Problem: INFECTION - ADULT  Goal: Absence or prevention of progression during hospitalization  Description: INTERVENTIONS:  - Assess and monitor for signs and symptoms of infection  - Monitor lab/diagnostic results  - Monitor all insertion sites, i.e. indwelling lines, tubes, and drains  - Monitor endotracheal if appropriate and nasal secretions for changes in amount and color  - [place] appropriate cooling/warming therapies per order  - Administer medications as ordered  - Instruct and encourage patient and family to use good hand hygiene technique  - Identify and instruct in appropriate isolation precautions for identified infection/condition  Outcome: Progressing  Goal: Absence of fever/infection during neutropenic period  Description: INTERVENTIONS:  - Monitor WBC  - Perform strict hand hygiene  - Limit to healthy visitors only  - No plants, dried, fresh or silk flowers with bardales in patient room  Outcome: Progressing     Problem: SAFETY ADULT  Goal: Patient will remain free of falls  Description: INTERVENTIONS:  - Educate patient/family on patient safety including physical limitations  - Instruct patient to call for assistance with activity   -  Consider consulting OT/PT to assist with strengthening/mobility based on AM PAC & JH-HLM score  - Consult OT/PT to assist with strengthening/mobility   - Keep Call bell within reach  - Keep bed low and locked with side rails adjusted as appropriate  - Keep care items and personal belongings within reach  - Initiate and maintain comfort rounds  - Make Fall Risk Sign visible to staff  - Offer Toileting every  Hours, in advance of need  - Initiate/Maintain alarm  - Obtain necessary fall risk management equipment:   - Apply yellow socks and bracelet for high fall risk patients  - Consider moving patient to room near nurses station  Outcome: Progressing  Goal: Maintain or return to baseline ADL function  Description: INTERVENTIONS:  -  Assess patient's ability to carry out ADLs; assess patient's baseline for ADL function and identify physical deficits which impact ability to perform ADLs (bathing, care of mouth/teeth, toileting, grooming, dressing, etc.)  - Assess/evaluate cause of self-care deficits   - Assess range of motion  - Assess patient's mobility; develop plan if impaired  - Assess patient's need for assistive devices and provide as appropriate  - Encourage maximum independence but intervene and supervise when necessary  - Involve family in performance of ADLs  - Assess for home care needs following discharge   - Consider OT consult to assist with ADL evaluation and planning for discharge  - Provide patient education as appropriate  - Monitor functional capacity and physical performance, use of AM PAC & JH-HLM   - Monitor gait, balance and fatigue with ambulation    Outcome: Progressing  Goal: Maintains/Returns to pre admission functional level  Description: INTERVENTIONS:  - Perform AM-PAC 6 Click Basic Mobility/ Daily Activity assessment daily.  - Set and communicate daily mobility goal to care team and patient/family/caregiver.   - Collaborate with rehabilitation services on mobility goals if consulted  -  Perform Range of Motion  times a day.  - Reposition patient every  hours.  - Dangle patient  times a day  - Stand patient  times a day  - Ambulate patient  times a day  - Out of bed to chair  times a day   - Out of bed for meals  times a day  - Out of bed for toileting  - Record patient progress and toleration of activity level   Outcome: Progressing     Problem: DISCHARGE PLANNING  Goal: Discharge to home or other facility with appropriate resources  Description: INTERVENTIONS:  - Identify barriers to discharge w/patient and caregiver  - Arrange for needed discharge resources and transportation as appropriate  - Identify discharge learning needs (meds, wound care, etc.)  - Arrange for interpretive services to assist at discharge as needed  - Refer to Case Management Department for coordinating discharge planning if the patient needs post-hospital services based on physician/advanced practitioner order or complex needs related to functional status, cognitive ability, or social support system  Outcome: Progressing     Problem: Knowledge Deficit  Goal: Patient/family/caregiver demonstrates understanding of disease process, treatment plan, medications, and discharge instructions  Description: Complete learning assessment and assess knowledge base.  Interventions:  - Provide teaching at level of understanding  - Provide teaching via preferred learning methods  Outcome: Progressing     Problem: Nutrition/Hydration-ADULT  Goal: Nutrient/Hydration intake appropriate for improving, restoring or maintaining nutritional needs  Description: Monitor and assess patient's nutrition/hydration status for malnutrition. Collaborate with interdisciplinary team and initiate plan and interventions as ordered.  Monitor patient's weight and dietary intake as ordered or per policy. Utilize nutrition screening tool and intervene as necessary. Determine patient's food preferences and provide high-protein, high-caloric foods as appropriate.      INTERVENTIONS:  - Monitor oral intake, urinary output, labs, and treatment plans  - Assess nutrition and hydration status and recommend course of action  - Evaluate amount of meals eaten  - Assist patient with eating if necessary   - Allow adequate time for meals  - Recommend/ encourage appropriate diets, oral nutritional supplements, and vitamin/mineral supplements  - Order, calculate, and assess calorie counts as needed  - Recommend, monitor, and adjust tube feedings and TPN/PPN based on assessed needs  - Assess need for intravenous fluids  - Provide specific nutrition/hydration education as appropriate  - Include patient/family/caregiver in decisions related to nutrition  Outcome: Progressing     Problem: GASTROINTESTINAL - ADULT  Goal: Minimal or absence of nausea and/or vomiting  Description: INTERVENTIONS:  - Administer IV fluids if ordered to ensure adequate hydration  - Maintain NPO status until nausea and vomiting are resolved  - Nasogastric tube if ordered  - Administer ordered antiemetic medications as needed  - Provide nonpharmacologic comfort measures as appropriate  - Advance diet as tolerated, if ordered  - Consider nutrition services referral to assist patient with adequate nutrition and appropriate food choices  Outcome: Progressing  Goal: Maintains or returns to baseline bowel function  Description: INTERVENTIONS:  - Assess bowel function  - Encourage oral fluids to ensure adequate hydration  - Administer IV fluids if ordered to ensure adequate hydration  - Administer ordered medications as needed  - Encourage mobilization and activity  - Consider nutritional services referral to assist patient with adequate nutrition and appropriate food choices  Outcome: Progressing  Goal: Maintains adequate nutritional intake  Description: INTERVENTIONS:  - Monitor percentage of each meal consumed  - Identify factors contributing to decreased intake, treat as appropriate  - Assist with meals as needed  -  Monitor I&O, weight, and lab values if indicated  - Obtain nutrition services referral as needed  Outcome: Progressing  Goal: Establish and maintain optimal ostomy function  Description: INTERVENTIONS:  - Assess bowel function  - Encourage oral fluids to ensure adequate hydration  - Administer IV fluids if ordered to ensure adequate hydration   - Administer ordered medications as needed  - Encourage mobilization and activity  - Nutrition services referral to assist patient with appropriate food choices  - Assess stoma site  - Consider wound care consult   Outcome: Progressing  Goal: Oral mucous membranes remain intact  Description: INTERVENTIONS  - Assess oral mucosa and hygiene practices  - Implement preventative oral hygiene regimen  - Implement oral medicated treatments as ordered  - Initiate Nutrition services referral as needed  Outcome: Progressing

## 2025-06-25 LAB
ANION GAP SERPL CALCULATED.3IONS-SCNC: 7 MMOL/L (ref 4–13)
BACTERIA BLD CULT: NORMAL
BACTERIA BLD CULT: NORMAL
BACTERIA SPEC BFLD CULT: ABNORMAL
BACTERIA SPEC BFLD CULT: ABNORMAL
BASOPHILS # BLD AUTO: 0.07 THOUSANDS/ÂΜL (ref 0–0.1)
BASOPHILS NFR BLD AUTO: 1 % (ref 0–1)
BUN SERPL-MCNC: 7 MG/DL (ref 5–25)
CALCIUM SERPL-MCNC: 7.7 MG/DL (ref 8.4–10.2)
CHLORIDE SERPL-SCNC: 110 MMOL/L (ref 96–108)
CO2 SERPL-SCNC: 23 MMOL/L (ref 21–32)
CREAT SERPL-MCNC: 0.63 MG/DL (ref 0.6–1.3)
EOSINOPHIL # BLD AUTO: 0.43 THOUSAND/ÂΜL (ref 0–0.61)
EOSINOPHIL NFR BLD AUTO: 4 % (ref 0–6)
ERYTHROCYTE [DISTWIDTH] IN BLOOD BY AUTOMATED COUNT: 12.5 % (ref 11.6–15.1)
GFR SERPL CREATININE-BSD FRML MDRD: 121 ML/MIN/1.73SQ M
GLUCOSE SERPL-MCNC: 98 MG/DL (ref 65–140)
GRAM STN SPEC: ABNORMAL
GRAM STN SPEC: ABNORMAL
HCT VFR BLD AUTO: 44 % (ref 36.5–49.3)
HGB BLD-MCNC: 15.1 G/DL (ref 12–17)
IMM GRANULOCYTES # BLD AUTO: 0.1 THOUSAND/UL (ref 0–0.2)
IMM GRANULOCYTES NFR BLD AUTO: 1 % (ref 0–2)
LYMPHOCYTES # BLD AUTO: 1.83 THOUSANDS/ÂΜL (ref 0.6–4.47)
LYMPHOCYTES NFR BLD AUTO: 16 % (ref 14–44)
MCH RBC QN AUTO: 31.2 PG (ref 26.8–34.3)
MCHC RBC AUTO-ENTMCNC: 34.3 G/DL (ref 31.4–37.4)
MCV RBC AUTO: 91 FL (ref 82–98)
MONOCYTES # BLD AUTO: 1.35 THOUSAND/ÂΜL (ref 0.17–1.22)
MONOCYTES NFR BLD AUTO: 11 % (ref 4–12)
NEUTROPHILS # BLD AUTO: 8.04 THOUSANDS/ÂΜL (ref 1.85–7.62)
NEUTS SEG NFR BLD AUTO: 67 % (ref 43–75)
NRBC BLD AUTO-RTO: 0 /100 WBCS
PLATELET # BLD AUTO: 312 THOUSANDS/UL (ref 149–390)
PMV BLD AUTO: 9.4 FL (ref 8.9–12.7)
POTASSIUM SERPL-SCNC: 3.3 MMOL/L (ref 3.5–5.3)
RBC # BLD AUTO: 4.84 MILLION/UL (ref 3.88–5.62)
SODIUM SERPL-SCNC: 140 MMOL/L (ref 135–147)
WBC # BLD AUTO: 11.82 THOUSAND/UL (ref 4.31–10.16)

## 2025-06-25 PROCEDURE — 80048 BASIC METABOLIC PNL TOTAL CA: CPT | Performed by: NURSE PRACTITIONER

## 2025-06-25 PROCEDURE — 85025 COMPLETE CBC W/AUTO DIFF WBC: CPT | Performed by: NURSE PRACTITIONER

## 2025-06-25 RX ORDER — POTASSIUM CHLORIDE 14.9 MG/ML
20 INJECTION INTRAVENOUS
Status: COMPLETED | OUTPATIENT
Start: 2025-06-25 | End: 2025-06-25

## 2025-06-25 RX ADMIN — POTASSIUM CHLORIDE 20 MEQ: 14.9 INJECTION, SOLUTION INTRAVENOUS at 13:10

## 2025-06-25 RX ADMIN — METRONIDAZOLE 500 MG: 500 INJECTION, SOLUTION INTRAVENOUS at 01:29

## 2025-06-25 RX ADMIN — HEPARIN SODIUM 5000 UNITS: 5000 INJECTION INTRAVENOUS; SUBCUTANEOUS at 04:55

## 2025-06-25 RX ADMIN — METRONIDAZOLE 500 MG: 500 INJECTION, SOLUTION INTRAVENOUS at 08:14

## 2025-06-25 RX ADMIN — METRONIDAZOLE 500 MG: 500 INJECTION, SOLUTION INTRAVENOUS at 18:02

## 2025-06-25 RX ADMIN — SODIUM CHLORIDE 125 ML/HR: 0.9 INJECTION, SOLUTION INTRAVENOUS at 15:12

## 2025-06-25 RX ADMIN — HEPARIN SODIUM 5000 UNITS: 5000 INJECTION INTRAVENOUS; SUBCUTANEOUS at 22:05

## 2025-06-25 RX ADMIN — CEFTRIAXONE 2000 MG: 2 INJECTION, SOLUTION INTRAVENOUS at 17:10

## 2025-06-25 RX ADMIN — PANTOPRAZOLE SODIUM 40 MG: 40 INJECTION, POWDER, FOR SOLUTION INTRAVENOUS at 22:04

## 2025-06-25 RX ADMIN — POTASSIUM CHLORIDE 20 MEQ: 14.9 INJECTION, SOLUTION INTRAVENOUS at 11:05

## 2025-06-25 RX ADMIN — PANTOPRAZOLE SODIUM 40 MG: 40 INJECTION, POWDER, FOR SOLUTION INTRAVENOUS at 08:10

## 2025-06-25 RX ADMIN — NICOTINE 1 PATCH: 14 PATCH, EXTENDED RELEASE TRANSDERMAL at 08:11

## 2025-06-25 RX ADMIN — HEPARIN SODIUM 5000 UNITS: 5000 INJECTION INTRAVENOUS; SUBCUTANEOUS at 13:11

## 2025-06-25 RX ADMIN — SODIUM CHLORIDE 125 ML/HR: 0.9 INJECTION, SOLUTION INTRAVENOUS at 06:42

## 2025-06-25 NOTE — PLAN OF CARE
Problem: PAIN - ADULT  Goal: Verbalizes/displays adequate comfort level or baseline comfort level  Description: Interventions:  - Encourage patient to monitor pain and request assistance  - Assess pain using appropriate pain scale  - Administer analgesics as ordered based on type and severity of pain and evaluate response  - Implement non-pharmacological measures as appropriate and evaluate response  - Consider cultural and social influences on pain and pain management  - Notify physician/advanced practitioner if interventions unsuccessful or patient reports new pain  - Educate patient/family on pain management process including their role and importance of  reporting pain   - Provide non-pharmacologic/complimentary pain relief interventions  Outcome: Progressing     Problem: INFECTION - ADULT  Goal: Absence or prevention of progression during hospitalization  Description: INTERVENTIONS:  - Assess and monitor for signs and symptoms of infection  - Monitor lab/diagnostic results  - Monitor all insertion sites, i.e. indwelling lines, tubes, and drains  - Monitor endotracheal if appropriate and nasal secretions for changes in amount and color  - [place] appropriate cooling/warming therapies per order  - Administer medications as ordered  - Instruct and encourage patient and family to use good hand hygiene technique  - Identify and instruct in appropriate isolation precautions for identified infection/condition  Outcome: Progressing     Problem: Nutrition/Hydration-ADULT  Goal: Nutrient/Hydration intake appropriate for improving, restoring or maintaining nutritional needs  Description: Monitor and assess patient's nutrition/hydration status for malnutrition. Collaborate with interdisciplinary team and initiate plan and interventions as ordered.  Monitor patient's weight and dietary intake as ordered or per policy. Utilize nutrition screening tool and intervene as necessary. Determine patient's food preferences and  provide high-protein, high-caloric foods as appropriate.     INTERVENTIONS:  - Monitor oral intake, urinary output, labs, and treatment plans  - Assess nutrition and hydration status and recommend course of action  - Evaluate amount of meals eaten  - Assist patient with eating if necessary   - Allow adequate time for meals  - Recommend/ encourage appropriate diets, oral nutritional supplements, and vitamin/mineral supplements  - Order, calculate, and assess calorie counts as needed  - Recommend, monitor, and adjust tube feedings and TPN/PPN based on assessed needs  - Assess need for intravenous fluids  - Provide specific nutrition/hydration education as appropriate  - Include patient/family/caregiver in decisions related to nutrition  Outcome: Progressing     Problem: GASTROINTESTINAL - ADULT  Goal: Minimal or absence of nausea and/or vomiting  Description: INTERVENTIONS:  - Administer IV fluids if ordered to ensure adequate hydration  - Maintain NPO status until nausea and vomiting are resolved  - Nasogastric tube if ordered  - Administer ordered antiemetic medications as needed  - Provide nonpharmacologic comfort measures as appropriate  - Advance diet as tolerated, if ordered  - Consider nutrition services referral to assist patient with adequate nutrition and appropriate food choices  Outcome: Progressing

## 2025-06-25 NOTE — ASSESSMENT & PLAN NOTE
Postop day #5 status post exploratory laparotomy, Omar patch repair of perforated pyloric ulcer.      -Tolerating clear liquid diet since yesterday.  Denies nausea, vomiting, bloating, abdominal pain  - Abdomen is soft, NT, ND, incision with staples intact, no erythema or drainage    -ROBYN drain with serosanguineous output 200 (300, 395ml)    Upper GI 06/23:  -Contrast flows freely from the stomach lumen into the duodenum with no extravasation. Especially no contrast extravasated at the pyloric ulcer patch.     Mild leukocytosis 11.8 (11.22,12.99, 12.87)  Hgb stable   Hypokalemia 3.3 (4.5, 3.6, 3.3)    Plan:  -Advance to soft surgical diet, patient advised not to overdo it at first  -Replete potassium  -Encourage out of bed  -Continue IV Rocephin and Flagyl  -Continue IV PPI  -Will leave ROBYN in place until tolerating a diet  -Continue current pain control  -anticipate d/c home in 24 hours pending diet tolerance

## 2025-06-25 NOTE — PROGRESS NOTES
Progress Note - Surgery-General   Name: Mykel Gonzales 44 y.o. male I MRN: [id number]  Unit/Bed#: [facility] I Date of Admission: 6/20/2025   Date of Service: 6/25/2025 I Hospital Day: 5    Assessment & Plan  Pneumoperitoneum    Postop day #5 status post exploratory laparotomy, Omar patch repair of perforated pyloric ulcer.      -Tolerating clear liquid diet since yesterday.  Denies nausea, vomiting, bloating, abdominal pain  - Abdomen is soft, NT, ND, incision with staples intact, no erythema or drainage    -ROBYN drain with serosanguineous output 200 (300, 395ml)    Upper GI 06/23:  -Contrast flows freely from the stomach lumen into the duodenum with no extravasation. Especially no contrast extravasated at the pyloric ulcer patch.     Mild leukocytosis 11.8 (11.22,12.99, 12.87)  Hgb stable   Hypokalemia 3.3 (4.5, 3.6, 3.3)    Plan:  -Advance to soft surgical diet, patient advised not to overdo it at first  -Replete potassium  -Encourage out of bed  -Continue IV Rocephin and Flagyl  -Continue IV PPI  -Will leave ROBYN in place until tolerating a diet  -Continue current pain control  -anticipate d/c home in 24 hours pending diet tolerance    Tobacco abuse  Topical nicotine replacement patch.  Alcohol abuse  No signs of any alcohol withdrawal symptoms.        Subjective   He is doing well.  He has been up ambulating..  Denies significant abdominal pain.  Denies nausea, vomiting, dizziness, shortness of breath, chest pain, bloating.  He did have a bowel movement 2 days ago.  He has been tolerating clear liquids without any difficulty.    Objective :  Temp:  [96.8 °F (36 °C)-97 °F (36.1 °C)] 97 °F (36.1 °C)  HR:  [66-79] 77  BP: (141-154)/(82-96) 150/82  Resp:  [16-18] 16  SpO2:  [93 %-95 %] 93 %  O2 Device: None (Room air)    I/O         06/23 0701  06/24 0700 06/24 0701  06/25 0700 06/25 0701  06/26 0700    P.O.  780 90    I.V. (mL/kg) 4554.2 (65.6) 933.3 (13.4)     Total Intake(mL/kg) 4554.2 (65.6) 1713.3 (24.7) 90  (1.3)    Urine (mL/kg/hr) 900 (0.5) 1425 (0.9)     Emesis/NG output 300      Drains 300 220 40    Stool 0      Total Output 1500 1645 40    Net +3054.2 +68.3 +50           Unmeasured Urine Occurrence  5 x     Unmeasured Stool Occurrence 1 x            Lines/Drains/Airways       Active Status       Name Placement date Placement time Site Days    Closed/Suction Drain RLQ Bulb 19 Fr. 06/20/25  1322  RLQ  4                  Physical Exam  Vitals reviewed.   Constitutional:       General: He is not in acute distress.     Appearance: He is not ill-appearing, toxic-appearing or diaphoretic.   HENT:      Head: Normocephalic and atraumatic.      Mouth/Throat:      Mouth: Mucous membranes are moist.      Pharynx: No oropharyngeal exudate.     Eyes:      General: No scleral icterus.      Cardiovascular:      Rate and Rhythm: Normal rate.   Pulmonary:      Effort: Pulmonary effort is normal. No respiratory distress.   Abdominal:      General: There is no distension.      Palpations: Abdomen is soft. There is no mass.      Tenderness: There is no abdominal tenderness. There is no guarding or rebound.      Hernia: No hernia is present.      Comments: Abd is soft, nontender, non distended with incision well approximated with staples without any erythema or drainage. Right sided ROBYN with serosang drainage in bulb and tiny amount of drainage on dressing without erythema at insertion site.      Musculoskeletal:         General: Normal range of motion.     Skin:     General: Skin is warm and dry.      Coloration: Skin is not jaundiced.     Neurological:      General: No focal deficit present.      Mental Status: He is alert and oriented to person, place, and time.     Psychiatric:         Mood and Affect: Mood normal.           Lab Results: I have reviewed the following results:  Recent Labs     06/23/25  0501 06/24/25  0529 06/25/25  0452   WBC 12.99*   < > 11.82*   HGB 14.9   < > 15.1   HCT 45.2   < > 44.0      < > 312    SODIUM 142   < > 140   K 3.6   < > 3.3*   *   < > 110*   CO2 20*   < > 23   BUN 15   < > 7   CREATININE 0.65   < > 0.63   GLUC 74   < > 98   MG 2.0  --   --     < > = values in this interval not displayed.             VTE Pharmacologic Prophylaxis: VTE covered by:  heparin (porcine), Subcutaneous, 5,000 Units at 06/25/25 4776     VTE Mechanical Prophylaxis: sequential compression device

## 2025-06-26 LAB
ANION GAP SERPL CALCULATED.3IONS-SCNC: 7 MMOL/L (ref 4–13)
BASOPHILS # BLD AUTO: 0.09 THOUSANDS/ÂΜL (ref 0–0.1)
BASOPHILS NFR BLD AUTO: 1 % (ref 0–1)
BUN SERPL-MCNC: 6 MG/DL (ref 5–25)
CALCIUM SERPL-MCNC: 7.7 MG/DL (ref 8.4–10.2)
CHLORIDE SERPL-SCNC: 107 MMOL/L (ref 96–108)
CO2 SERPL-SCNC: 25 MMOL/L (ref 21–32)
CREAT SERPL-MCNC: 0.63 MG/DL (ref 0.6–1.3)
EOSINOPHIL # BLD AUTO: 0.23 THOUSAND/ÂΜL (ref 0–0.61)
EOSINOPHIL NFR BLD AUTO: 2 % (ref 0–6)
ERYTHROCYTE [DISTWIDTH] IN BLOOD BY AUTOMATED COUNT: 12.7 % (ref 11.6–15.1)
GFR SERPL CREATININE-BSD FRML MDRD: 121 ML/MIN/1.73SQ M
GLUCOSE SERPL-MCNC: 103 MG/DL (ref 65–140)
HCT VFR BLD AUTO: 49.5 % (ref 36.5–49.3)
HGB BLD-MCNC: 16.5 G/DL (ref 12–17)
IMM GRANULOCYTES # BLD AUTO: 0.14 THOUSAND/UL (ref 0–0.2)
IMM GRANULOCYTES NFR BLD AUTO: 1 % (ref 0–2)
LYMPHOCYTES # BLD AUTO: 1.8 THOUSANDS/ÂΜL (ref 0.6–4.47)
LYMPHOCYTES NFR BLD AUTO: 14 % (ref 14–44)
MAGNESIUM SERPL-MCNC: 1.7 MG/DL (ref 1.9–2.7)
MCH RBC QN AUTO: 30.1 PG (ref 26.8–34.3)
MCHC RBC AUTO-ENTMCNC: 33.3 G/DL (ref 31.4–37.4)
MCV RBC AUTO: 90 FL (ref 82–98)
MONOCYTES # BLD AUTO: 1.32 THOUSAND/ÂΜL (ref 0.17–1.22)
MONOCYTES NFR BLD AUTO: 10 % (ref 4–12)
NEUTROPHILS # BLD AUTO: 9.38 THOUSANDS/ÂΜL (ref 1.85–7.62)
NEUTS SEG NFR BLD AUTO: 72 % (ref 43–75)
NRBC BLD AUTO-RTO: 0 /100 WBCS
PLATELET # BLD AUTO: 228 THOUSANDS/UL (ref 149–390)
PMV BLD AUTO: 9.6 FL (ref 8.9–12.7)
POTASSIUM SERPL-SCNC: 3.2 MMOL/L (ref 3.5–5.3)
RBC # BLD AUTO: 5.49 MILLION/UL (ref 3.88–5.62)
SODIUM SERPL-SCNC: 139 MMOL/L (ref 135–147)
WBC # BLD AUTO: 12.96 THOUSAND/UL (ref 4.31–10.16)

## 2025-06-26 PROCEDURE — 80048 BASIC METABOLIC PNL TOTAL CA: CPT | Performed by: PHYSICIAN ASSISTANT

## 2025-06-26 PROCEDURE — 85025 COMPLETE CBC W/AUTO DIFF WBC: CPT | Performed by: NURSE PRACTITIONER

## 2025-06-26 PROCEDURE — 83735 ASSAY OF MAGNESIUM: CPT | Performed by: NURSE PRACTITIONER

## 2025-06-26 RX ORDER — LANOLIN ALCOHOL/MO/W.PET/CERES
400 CREAM (GRAM) TOPICAL 2 TIMES DAILY
Status: COMPLETED | OUTPATIENT
Start: 2025-06-26 | End: 2025-06-26

## 2025-06-26 RX ORDER — POTASSIUM CHLORIDE 1500 MG/1
40 TABLET, EXTENDED RELEASE ORAL ONCE
Status: COMPLETED | OUTPATIENT
Start: 2025-06-26 | End: 2025-06-26

## 2025-06-26 RX ADMIN — HEPARIN SODIUM 5000 UNITS: 5000 INJECTION INTRAVENOUS; SUBCUTANEOUS at 06:15

## 2025-06-26 RX ADMIN — PANTOPRAZOLE SODIUM 40 MG: 40 INJECTION, POWDER, FOR SOLUTION INTRAVENOUS at 21:07

## 2025-06-26 RX ADMIN — Medication 400 MG: at 16:58

## 2025-06-26 RX ADMIN — CEFTRIAXONE 2000 MG: 2 INJECTION, SOLUTION INTRAVENOUS at 16:05

## 2025-06-26 RX ADMIN — Medication 400 MG: at 10:42

## 2025-06-26 RX ADMIN — HEPARIN SODIUM 5000 UNITS: 5000 INJECTION INTRAVENOUS; SUBCUTANEOUS at 21:06

## 2025-06-26 RX ADMIN — METRONIDAZOLE 500 MG: 500 INJECTION, SOLUTION INTRAVENOUS at 08:48

## 2025-06-26 RX ADMIN — NICOTINE 1 PATCH: 14 PATCH, EXTENDED RELEASE TRANSDERMAL at 08:46

## 2025-06-26 RX ADMIN — METRONIDAZOLE 500 MG: 500 INJECTION, SOLUTION INTRAVENOUS at 16:57

## 2025-06-26 RX ADMIN — POTASSIUM CHLORIDE 40 MEQ: 1500 TABLET, EXTENDED RELEASE ORAL at 09:52

## 2025-06-26 RX ADMIN — METRONIDAZOLE 500 MG: 500 INJECTION, SOLUTION INTRAVENOUS at 01:18

## 2025-06-26 RX ADMIN — SODIUM CHLORIDE 50 ML/HR: 0.9 INJECTION, SOLUTION INTRAVENOUS at 12:53

## 2025-06-26 NOTE — QUICK NOTE
ROBYN drain removed from RLQ without complication. Minimal ss drainage in bulb. Wound recovered with sterile dressing.

## 2025-06-26 NOTE — PLAN OF CARE
Problem: PAIN - ADULT  Goal: Verbalizes/displays adequate comfort level or baseline comfort level  Description: Interventions:  - Encourage patient to monitor pain and request assistance  - Assess pain using appropriate pain scale  - Administer analgesics as ordered based on type and severity of pain and evaluate response  - Implement non-pharmacological measures as appropriate and evaluate response  - Consider cultural and social influences on pain and pain management  - Notify physician/advanced practitioner if interventions unsuccessful or patient reports new pain  - Educate patient/family on pain management process including their role and importance of  reporting pain   - Provide non-pharmacologic/complimentary pain relief interventions  Outcome: Progressing     Problem: INFECTION - ADULT  Goal: Absence or prevention of progression during hospitalization  Description: INTERVENTIONS:  - Assess and monitor for signs and symptoms of infection  - Monitor lab/diagnostic results  - Monitor all insertion sites, i.e. indwelling lines, tubes, and drains  - Monitor endotracheal if appropriate and nasal secretions for changes in amount and color  - [place] appropriate cooling/warming therapies per order  - Administer medications as ordered  - Instruct and encourage patient and family to use good hand hygiene technique  - Identify and instruct in appropriate isolation precautions for identified infection/condition  Outcome: Progressing  Goal: Absence of fever/infection during neutropenic period  Description: INTERVENTIONS:  - Monitor WBC  - Perform strict hand hygiene  - Limit to healthy visitors only  - No plants, dried, fresh or silk flowers with bardales in patient room  Outcome: Progressing

## 2025-06-26 NOTE — PLAN OF CARE
Problem: PAIN - ADULT  Goal: Verbalizes/displays adequate comfort level or baseline comfort level  Description: Interventions:  - Encourage patient to monitor pain and request assistance  - Assess pain using appropriate pain scale  - Administer analgesics as ordered based on type and severity of pain and evaluate response  - Implement non-pharmacological measures as appropriate and evaluate response  - Consider cultural and social influences on pain and pain management  - Notify physician/advanced practitioner if interventions unsuccessful or patient reports new pain  - Educate patient/family on pain management process including their role and importance of  reporting pain   - Provide non-pharmacologic/complimentary pain relief interventions  Outcome: Progressing     Problem: INFECTION - ADULT  Goal: Absence or prevention of progression during hospitalization  Description: INTERVENTIONS:  - Assess and monitor for signs and symptoms of infection  - Monitor lab/diagnostic results  - Monitor all insertion sites, i.e. indwelling lines, tubes, and drains  - Monitor endotracheal if appropriate and nasal secretions for changes in amount and color  - [place] appropriate cooling/warming therapies per order  - Administer medications as ordered  - Instruct and encourage patient and family to use good hand hygiene technique  - Identify and instruct in appropriate isolation precautions for identified infection/condition  Outcome: Progressing  Goal: Absence of fever/infection during neutropenic period  Description: INTERVENTIONS:  - Monitor WBC  - Perform strict hand hygiene  - Limit to healthy visitors only  - No plants, dried, fresh or silk flowers with bardales in patient room  Outcome: Progressing     Problem: SAFETY ADULT  Goal: Patient will remain free of falls  Description: INTERVENTIONS:  - Educate patient/family on patient safety including physical limitations  - Instruct patient to call for assistance with activity   -  Consider consulting OT/PT to assist with strengthening/mobility based on AM PAC & JH-HLM score  - Consult OT/PT to assist with strengthening/mobility   - Keep Call bell within reach  - Keep bed low and locked with side rails adjusted as appropriate  - Keep care items and personal belongings within reach  - Initiate and maintain comfort rounds  - Make Fall Risk Sign visible to staff  - Offer Toileting every  Hours, in advance of need  - Initiate/Maintain alarm  - Obtain necessary fall risk management equipment:   - Apply yellow socks and bracelet for high fall risk patients  - Consider moving patient to room near nurses station  Outcome: Progressing  Goal: Maintain or return to baseline ADL function  Description: INTERVENTIONS:  -  Assess patient's ability to carry out ADLs; assess patient's baseline for ADL function and identify physical deficits which impact ability to perform ADLs (bathing, care of mouth/teeth, toileting, grooming, dressing, etc.)  - Assess/evaluate cause of self-care deficits   - Assess range of motion  - Assess patient's mobility; develop plan if impaired  - Assess patient's need for assistive devices and provide as appropriate  - Encourage maximum independence but intervene and supervise when necessary  - Involve family in performance of ADLs  - Assess for home care needs following discharge   - Consider OT consult to assist with ADL evaluation and planning for discharge  - Provide patient education as appropriate  - Monitor functional capacity and physical performance, use of AM PAC & JH-HLM   - Monitor gait, balance and fatigue with ambulation    Outcome: Progressing  Goal: Maintains/Returns to pre admission functional level  Description: INTERVENTIONS:  - Perform AM-PAC 6 Click Basic Mobility/ Daily Activity assessment daily.  - Set and communicate daily mobility goal to care team and patient/family/caregiver.   - Collaborate with rehabilitation services on mobility goals if consulted  -  Perform Range of Motion  times a day.  - Reposition patient every  hours.  - Dangle patient  times a day  - Stand patient  times a day  - Ambulate patient  times a day  - Out of bed to chair  times a day   - Out of bed for meals  times a day  - Out of bed for toileting  - Record patient progress and toleration of activity level   Outcome: Progressing     Problem: DISCHARGE PLANNING  Goal: Discharge to home or other facility with appropriate resources  Description: INTERVENTIONS:  - Identify barriers to discharge w/patient and caregiver  - Arrange for needed discharge resources and transportation as appropriate  - Identify discharge learning needs (meds, wound care, etc.)  - Arrange for interpretive services to assist at discharge as needed  - Refer to Case Management Department for coordinating discharge planning if the patient needs post-hospital services based on physician/advanced practitioner order or complex needs related to functional status, cognitive ability, or social support system  Outcome: Progressing     Problem: Knowledge Deficit  Goal: Patient/family/caregiver demonstrates understanding of disease process, treatment plan, medications, and discharge instructions  Description: Complete learning assessment and assess knowledge base.  Interventions:  - Provide teaching at level of understanding  - Provide teaching via preferred learning methods  Outcome: Progressing     Problem: Nutrition/Hydration-ADULT  Goal: Nutrient/Hydration intake appropriate for improving, restoring or maintaining nutritional needs  Description: Monitor and assess patient's nutrition/hydration status for malnutrition. Collaborate with interdisciplinary team and initiate plan and interventions as ordered.  Monitor patient's weight and dietary intake as ordered or per policy. Utilize nutrition screening tool and intervene as necessary. Determine patient's food preferences and provide high-protein, high-caloric foods as appropriate.      INTERVENTIONS:  - Monitor oral intake, urinary output, labs, and treatment plans  - Assess nutrition and hydration status and recommend course of action  - Evaluate amount of meals eaten  - Assist patient with eating if necessary   - Allow adequate time for meals  - Recommend/ encourage appropriate diets, oral nutritional supplements, and vitamin/mineral supplements  - Order, calculate, and assess calorie counts as needed  - Recommend, monitor, and adjust tube feedings and TPN/PPN based on assessed needs  - Assess need for intravenous fluids  - Provide specific nutrition/hydration education as appropriate  - Include patient/family/caregiver in decisions related to nutrition  Outcome: Progressing     Problem: GASTROINTESTINAL - ADULT  Goal: Minimal or absence of nausea and/or vomiting  Description: INTERVENTIONS:  - Administer IV fluids if ordered to ensure adequate hydration  - Maintain NPO status until nausea and vomiting are resolved  - Nasogastric tube if ordered  - Administer ordered antiemetic medications as needed  - Provide nonpharmacologic comfort measures as appropriate  - Advance diet as tolerated, if ordered  - Consider nutrition services referral to assist patient with adequate nutrition and appropriate food choices  Outcome: Progressing  Goal: Maintains or returns to baseline bowel function  Description: INTERVENTIONS:  - Assess bowel function  - Encourage oral fluids to ensure adequate hydration  - Administer IV fluids if ordered to ensure adequate hydration  - Administer ordered medications as needed  - Encourage mobilization and activity  - Consider nutritional services referral to assist patient with adequate nutrition and appropriate food choices  Outcome: Progressing  Goal: Maintains adequate nutritional intake  Description: INTERVENTIONS:  - Monitor percentage of each meal consumed  - Identify factors contributing to decreased intake, treat as appropriate  - Assist with meals as needed  -  Monitor I&O, weight, and lab values if indicated  - Obtain nutrition services referral as needed  Outcome: Progressing  Goal: Establish and maintain optimal ostomy function  Description: INTERVENTIONS:  - Assess bowel function  - Encourage oral fluids to ensure adequate hydration  - Administer IV fluids if ordered to ensure adequate hydration   - Administer ordered medications as needed  - Encourage mobilization and activity  - Nutrition services referral to assist patient with appropriate food choices  - Assess stoma site  - Consider wound care consult   Outcome: Progressing  Goal: Oral mucous membranes remain intact  Description: INTERVENTIONS  - Assess oral mucosa and hygiene practices  - Implement preventative oral hygiene regimen  - Implement oral medicated treatments as ordered  - Initiate Nutrition services referral as needed  Outcome: Progressing

## 2025-06-26 NOTE — PROGRESS NOTES
Progress Note - Surgery-General   Name: Mykel Gonzales 44 y.o. male I MRN: [id number]  Unit/Bed#: [facility] I Date of Admission: 6/20/2025   Date of Service: 6/26/2025 I Hospital Day: 6    Assessment & Plan  Pneumoperitoneum    Postop day #6 status post exploratory laparotomy, Omar patch repair of perforated pyloric ulcer.      -Tolerating soft surgical diet since yesterday.  Denies nausea, vomiting, bloating, abdominal pain  - Abdomen is soft, NT, ND, incision with staples intact, no erythema or drainage  -Soft BM this am    -ROBYN drain with serosanguineous output 120ml (200, 300, 395ml)    Upper GI 06/23:  -Contrast flows freely from the stomach lumen into the duodenum with no extravasation. Especially no contrast extravasated at the pyloric ulcer patch.     Increased mild leukocytosis 12.9 (11.8,11.22,12.99, 12.87)  Hgb stable 16  Hypokalemia 3.2 (3.3, 4.5, 3.6, 3.3)  Magnesium 1.7  VSS and WNL    Plan:  -Continue soft surgical diet  -Replete potassium and magnesium  -d/c ROBYN drain  -repeat labs  -Encourage out of bed  -Continue IV Rocephin and Flagyl  -Continue IV PPI  -anticipate d/c home in 24 hours pending repeat labs tomorrow    Tobacco abuse  Topical nicotine replacement patch.  Alcohol abuse  No signs of any alcohol withdrawal symptoms.  Perforated viscus          Subjective   Tolerating soft surgical diet with no nausea, bloating, abdominal pain. Denies fever, urinary difficulty, sob, cp. Ambulating around room.     Objective :  Temp:  [94.5 °F (34.7 °C)-98.1 °F (36.7 °C)] 98.1 °F (36.7 °C)  HR:  [76-93] 76  BP: (135-151)/(80-98) 151/90  Resp:  [15-20] 20  SpO2:  [92 %-95 %] 93 %  O2 Device: None (Room air)    I/O         06/24 0701 06/25 0700 06/25 0701 06/26 0700 06/26 0701 06/27 0700    P.O. 780 450     I.V. (mL/kg) 933.3 (13.4) 3225 (46.5)     Total Intake(mL/kg) 1713.3 (24.7) 3675 (53)     Urine (mL/kg/hr) 1425 (0.9) 2025 (1.2)     Emesis/NG output       Drains 220 120     Stool       Total  Output 1645 2145     Net +68.3 +1530            Unmeasured Urine Occurrence 5 x 4 x           Lines/Drains/Airways       Active Status       Name Placement date Placement time Site Days    Closed/Suction Drain RLQ Bulb 19 Fr. 06/20/25  1322  RLQ  5                  Physical Exam  Vitals and nursing note reviewed.   Constitutional:       General: He is not in acute distress.     Appearance: Normal appearance. He is well-developed and normal weight. He is not ill-appearing.   HENT:      Head: Normocephalic and atraumatic.      Nose: Nose normal.      Mouth/Throat:      Mouth: Mucous membranes are moist.     Eyes:      Conjunctiva/sclera: Conjunctivae normal.       Cardiovascular:      Rate and Rhythm: Normal rate and regular rhythm.   Pulmonary:      Effort: Pulmonary effort is normal. No respiratory distress.      Breath sounds: Normal breath sounds.   Abdominal:      General: Abdomen is flat. There is no distension.      Palpations: Abdomen is soft. There is no mass.      Tenderness: There is no abdominal tenderness. There is no guarding or rebound.      Hernia: No hernia is present.      Comments: ROBYN drain in RLQ with serous fluid in bulb and leaking around drain. Dressing replaced.     Musculoskeletal:         General: No swelling.      Cervical back: Neck supple.     Skin:     General: Skin is warm and dry.      Capillary Refill: Capillary refill takes less than 2 seconds.     Neurological:      General: No focal deficit present.      Mental Status: He is alert and oriented to person, place, and time.     Psychiatric:         Mood and Affect: Mood normal.         Behavior: Behavior normal.         Lab Results: I have reviewed the following results:  Recent Labs     06/25/25  0452 06/26/25  0538   WBC 11.82*  --    HGB 15.1  --    HCT 44.0  --      --    SODIUM 140 139   K 3.3* 3.2*   * 107   CO2 23 25   BUN 7 6   CREATININE 0.63 0.63   GLUC 98 103             VTE Pharmacologic Prophylaxis: VTE  covered by:  heparin (porcine), Subcutaneous, 5,000 Units at 06/26/25 0615     VTE Mechanical Prophylaxis: sequential compression device

## 2025-06-26 NOTE — CASE MANAGEMENT
Case Management Discharge Planning Note    Patient name Mykel Gonzales  Location /-01 MRN [id number]  : 1980 Date 2025       Current Admission Date: 2025  Current Admission Diagnosis:Pneumoperitoneum   Patient Active Problem List    Diagnosis Date Noted    Tobacco abuse 2025    Alcohol abuse 2025    Pneumoperitoneum 2025    Perforated viscus 2025      LOS (days): 6  Geometric Mean LOS (GMLOS) (days): 9  Days to GMLOS:2.8     OBJECTIVE:  Risk of Unplanned Readmission Score: 8.35         Current admission status: Inpatient   Preferred Pharmacy:   [id number] - [place], [place] - [address]  [address] [facility]  [place] [address]  Phone: [phone] Fax: [phone]    Primary Care Provider: Debby Olmedo MD    Primary Insurance: OLIVIA SALCIDO PENDING  Secondary Insurance:     DISCHARGE DETAILS:     Chart reviewed aware of medical management. Case was discussed in multidisciplinary discharge meeting.  Clinical information supporting hospitalization: pt is Postop day #6 status post Omar patch repair of perforated pyloric ulcer. Followed by surgery. ROBYN drain removed today. Tolerating diet. Pt WBC is 12,000, plan is to repeat labs in AM. Anticipating d/c home tomorrow.     Barriers to discharge:  - medical management  Discharge plan: Home, pt is independent.      CM will continue to follow plan of care.

## 2025-06-27 VITALS
OXYGEN SATURATION: 95 % | RESPIRATION RATE: 18 BRPM | HEIGHT: 68 IN | SYSTOLIC BLOOD PRESSURE: 141 MMHG | HEART RATE: 81 BPM | DIASTOLIC BLOOD PRESSURE: 91 MMHG | BODY MASS INDEX: 23.19 KG/M2 | WEIGHT: 153 LBS | TEMPERATURE: 96.8 F

## 2025-06-27 LAB
ANION GAP SERPL CALCULATED.3IONS-SCNC: 6 MMOL/L (ref 4–13)
BASOPHILS # BLD AUTO: 0.08 THOUSANDS/ÂΜL (ref 0–0.1)
BASOPHILS NFR BLD AUTO: 1 % (ref 0–1)
BUN SERPL-MCNC: 6 MG/DL (ref 5–25)
CALCIUM SERPL-MCNC: 8 MG/DL (ref 8.4–10.2)
CHLORIDE SERPL-SCNC: 107 MMOL/L (ref 96–108)
CO2 SERPL-SCNC: 25 MMOL/L (ref 21–32)
CREAT SERPL-MCNC: 0.62 MG/DL (ref 0.6–1.3)
EOSINOPHIL # BLD AUTO: 0.39 THOUSAND/ÂΜL (ref 0–0.61)
EOSINOPHIL NFR BLD AUTO: 3 % (ref 0–6)
ERYTHROCYTE [DISTWIDTH] IN BLOOD BY AUTOMATED COUNT: 12.8 % (ref 11.6–15.1)
GFR SERPL CREATININE-BSD FRML MDRD: 122 ML/MIN/1.73SQ M
GLUCOSE SERPL-MCNC: 99 MG/DL (ref 65–140)
HCT VFR BLD AUTO: 47.5 % (ref 36.5–49.3)
HGB BLD-MCNC: 16.2 G/DL (ref 12–17)
IMM GRANULOCYTES # BLD AUTO: 0.15 THOUSAND/UL (ref 0–0.2)
IMM GRANULOCYTES NFR BLD AUTO: 1 % (ref 0–2)
LYMPHOCYTES # BLD AUTO: 2.01 THOUSANDS/ÂΜL (ref 0.6–4.47)
LYMPHOCYTES NFR BLD AUTO: 17 % (ref 14–44)
MAGNESIUM SERPL-MCNC: 1.7 MG/DL (ref 1.9–2.7)
MCH RBC QN AUTO: 30.9 PG (ref 26.8–34.3)
MCHC RBC AUTO-ENTMCNC: 34.1 G/DL (ref 31.4–37.4)
MCV RBC AUTO: 91 FL (ref 82–98)
MONOCYTES # BLD AUTO: 1.56 THOUSAND/ÂΜL (ref 0.17–1.22)
MONOCYTES NFR BLD AUTO: 13 % (ref 4–12)
NEUTROPHILS # BLD AUTO: 7.95 THOUSANDS/ÂΜL (ref 1.85–7.62)
NEUTS SEG NFR BLD AUTO: 65 % (ref 43–75)
NRBC BLD AUTO-RTO: 0 /100 WBCS
PLATELET # BLD AUTO: 318 THOUSANDS/UL (ref 149–390)
PMV BLD AUTO: 9.2 FL (ref 8.9–12.7)
POTASSIUM SERPL-SCNC: 3.3 MMOL/L (ref 3.5–5.3)
RBC # BLD AUTO: 5.25 MILLION/UL (ref 3.88–5.62)
SODIUM SERPL-SCNC: 138 MMOL/L (ref 135–147)
WBC # BLD AUTO: 12.14 THOUSAND/UL (ref 4.31–10.16)

## 2025-06-27 PROCEDURE — 88341 IMHCHEM/IMCYTCHM EA ADD ANTB: CPT | Performed by: PATHOLOGY

## 2025-06-27 PROCEDURE — 88342 IMHCHEM/IMCYTCHM 1ST ANTB: CPT | Performed by: PATHOLOGY

## 2025-06-27 PROCEDURE — 80048 BASIC METABOLIC PNL TOTAL CA: CPT | Performed by: NURSE PRACTITIONER

## 2025-06-27 PROCEDURE — 85025 COMPLETE CBC W/AUTO DIFF WBC: CPT | Performed by: NURSE PRACTITIONER

## 2025-06-27 PROCEDURE — 83735 ASSAY OF MAGNESIUM: CPT | Performed by: NURSE PRACTITIONER

## 2025-06-27 PROCEDURE — 88305 TISSUE EXAM BY PATHOLOGIST: CPT | Performed by: PATHOLOGY

## 2025-06-27 RX ORDER — POTASSIUM CHLORIDE 20MEQ/15ML
20 LIQUID (ML) ORAL ONCE
Status: COMPLETED | OUTPATIENT
Start: 2025-06-27 | End: 2025-06-27

## 2025-06-27 RX ORDER — MAGNESIUM SULFATE HEPTAHYDRATE 40 MG/ML
2 INJECTION, SOLUTION INTRAVENOUS ONCE
Status: COMPLETED | OUTPATIENT
Start: 2025-06-27 | End: 2025-06-27

## 2025-06-27 RX ORDER — METRONIDAZOLE 500 MG/1
500 TABLET ORAL EVERY 8 HOURS SCHEDULED
Qty: 21 TABLET | Refills: 0 | Status: SHIPPED | OUTPATIENT
Start: 2025-06-27 | End: 2025-07-04

## 2025-06-27 RX ORDER — PANTOPRAZOLE SODIUM 40 MG/1
40 TABLET, DELAYED RELEASE ORAL 2 TIMES DAILY
Qty: 28 TABLET | Refills: 0 | Status: SHIPPED | OUTPATIENT
Start: 2025-06-27 | End: 2025-07-11

## 2025-06-27 RX ORDER — CIPROFLOXACIN 500 MG/1
500 TABLET, FILM COATED ORAL EVERY 12 HOURS SCHEDULED
Qty: 14 TABLET | Refills: 0 | Status: SHIPPED | OUTPATIENT
Start: 2025-06-27 | End: 2025-07-04

## 2025-06-27 RX ADMIN — MAGNESIUM SULFATE HEPTAHYDRATE 2 G: 2 INJECTION, SOLUTION INTRAVENOUS at 10:07

## 2025-06-27 RX ADMIN — POTASSIUM CHLORIDE 20 MEQ: 1.5 SOLUTION ORAL at 08:42

## 2025-06-27 RX ADMIN — METRONIDAZOLE 500 MG: 500 INJECTION, SOLUTION INTRAVENOUS at 08:42

## 2025-06-27 RX ADMIN — HEPARIN SODIUM 5000 UNITS: 5000 INJECTION INTRAVENOUS; SUBCUTANEOUS at 05:21

## 2025-06-27 RX ADMIN — PANTOPRAZOLE SODIUM 40 MG: 40 INJECTION, POWDER, FOR SOLUTION INTRAVENOUS at 08:42

## 2025-06-27 RX ADMIN — NICOTINE 1 PATCH: 14 PATCH, EXTENDED RELEASE TRANSDERMAL at 08:44

## 2025-06-27 RX ADMIN — METRONIDAZOLE 500 MG: 500 INJECTION, SOLUTION INTRAVENOUS at 01:26

## 2025-06-27 NOTE — PLAN OF CARE
Problem: PAIN - ADULT  Goal: Verbalizes/displays adequate comfort level or baseline comfort level  Description: Interventions:  - Encourage patient to monitor pain and request assistance  - Assess pain using appropriate pain scale  - Administer analgesics as ordered based on type and severity of pain and evaluate response  - Implement non-pharmacological measures as appropriate and evaluate response  - Consider cultural and social influences on pain and pain management  - Notify physician/advanced practitioner if interventions unsuccessful or patient reports new pain  - Educate patient/family on pain management process including their role and importance of  reporting pain   - Provide non-pharmacologic/complimentary pain relief interventions  Outcome: Progressing     Problem: INFECTION - ADULT  Goal: Absence or prevention of progression during hospitalization  Description: INTERVENTIONS:  - Assess and monitor for signs and symptoms of infection  - Monitor lab/diagnostic results  - Monitor all insertion sites, i.e. indwelling lines, tubes, and drains  - Monitor endotracheal if appropriate and nasal secretions for changes in amount and color  - [place] appropriate cooling/warming therapies per order  - Administer medications as ordered  - Instruct and encourage patient and family to use good hand hygiene technique  - Identify and instruct in appropriate isolation precautions for identified infection/condition  Outcome: Progressing  Goal: Absence of fever/infection during neutropenic period  Description: INTERVENTIONS:  - Monitor WBC  - Perform strict hand hygiene  - Limit to healthy visitors only  - No plants, dried, fresh or silk flowers with bardales in patient room  Outcome: Progressing     Problem: SAFETY ADULT  Goal: Patient will remain free of falls  Description: INTERVENTIONS:  - Educate patient/family on patient safety including physical limitations  - Instruct patient to call for assistance with activity   -  Consider consulting OT/PT to assist with strengthening/mobility based on AM PAC & JH-HLM score  - Consult OT/PT to assist with strengthening/mobility   - Keep Call bell within reach  - Keep bed low and locked with side rails adjusted as appropriate  - Keep care items and personal belongings within reach  - Initiate and maintain comfort rounds  - Make Fall Risk Sign visible to staff  - Offer Toileting every  Hours, in advance of need  - Initiate/Maintain alarm  - Obtain necessary fall risk management equipment:   - Apply yellow socks and bracelet for high fall risk patients  - Consider moving patient to room near nurses station  Outcome: Progressing  Goal: Maintain or return to baseline ADL function  Description: INTERVENTIONS:  -  Assess patient's ability to carry out ADLs; assess patient's baseline for ADL function and identify physical deficits which impact ability to perform ADLs (bathing, care of mouth/teeth, toileting, grooming, dressing, etc.)  - Assess/evaluate cause of self-care deficits   - Assess range of motion  - Assess patient's mobility; develop plan if impaired  - Assess patient's need for assistive devices and provide as appropriate  - Encourage maximum independence but intervene and supervise when necessary  - Involve family in performance of ADLs  - Assess for home care needs following discharge   - Consider OT consult to assist with ADL evaluation and planning for discharge  - Provide patient education as appropriate  - Monitor functional capacity and physical performance, use of AM PAC & JH-HLM   - Monitor gait, balance and fatigue with ambulation    Outcome: Progressing  Goal: Maintains/Returns to pre admission functional level  Description: INTERVENTIONS:  - Perform AM-PAC 6 Click Basic Mobility/ Daily Activity assessment daily.  - Set and communicate daily mobility goal to care team and patient/family/caregiver.   - Collaborate with rehabilitation services on mobility goals if consulted  -  Perform Range of Motion  times a day.  - Reposition patient every  hours.  - Dangle patient  times a day  - Stand patient  times a day  - Ambulate patient  times a day  - Out of bed to chair  times a day   - Out of bed for meals  times a day  - Out of bed for toileting  - Record patient progress and toleration of activity level   Outcome: Progressing     Problem: DISCHARGE PLANNING  Goal: Discharge to home or other facility with appropriate resources  Description: INTERVENTIONS:  - Identify barriers to discharge w/patient and caregiver  - Arrange for needed discharge resources and transportation as appropriate  - Identify discharge learning needs (meds, wound care, etc.)  - Arrange for interpretive services to assist at discharge as needed  - Refer to Case Management Department for coordinating discharge planning if the patient needs post-hospital services based on physician/advanced practitioner order or complex needs related to functional status, cognitive ability, or social support system  Outcome: Progressing     Problem: Knowledge Deficit  Goal: Patient/family/caregiver demonstrates understanding of disease process, treatment plan, medications, and discharge instructions  Description: Complete learning assessment and assess knowledge base.  Interventions:  - Provide teaching at level of understanding  - Provide teaching via preferred learning methods  Outcome: Progressing     Problem: Nutrition/Hydration-ADULT  Goal: Nutrient/Hydration intake appropriate for improving, restoring or maintaining nutritional needs  Description: Monitor and assess patient's nutrition/hydration status for malnutrition. Collaborate with interdisciplinary team and initiate plan and interventions as ordered.  Monitor patient's weight and dietary intake as ordered or per policy. Utilize nutrition screening tool and intervene as necessary. Determine patient's food preferences and provide high-protein, high-caloric foods as appropriate.      INTERVENTIONS:  - Monitor oral intake, urinary output, labs, and treatment plans  - Assess nutrition and hydration status and recommend course of action  - Evaluate amount of meals eaten  - Assist patient with eating if necessary   - Allow adequate time for meals  - Recommend/ encourage appropriate diets, oral nutritional supplements, and vitamin/mineral supplements  - Order, calculate, and assess calorie counts as needed  - Recommend, monitor, and adjust tube feedings and TPN/PPN based on assessed needs  - Assess need for intravenous fluids  - Provide specific nutrition/hydration education as appropriate  - Include patient/family/caregiver in decisions related to nutrition  Outcome: Progressing     Problem: GASTROINTESTINAL - ADULT  Goal: Minimal or absence of nausea and/or vomiting  Description: INTERVENTIONS:  - Administer IV fluids if ordered to ensure adequate hydration  - Maintain NPO status until nausea and vomiting are resolved  - Nasogastric tube if ordered  - Administer ordered antiemetic medications as needed  - Provide nonpharmacologic comfort measures as appropriate  - Advance diet as tolerated, if ordered  - Consider nutrition services referral to assist patient with adequate nutrition and appropriate food choices  Outcome: Progressing  Goal: Maintains or returns to baseline bowel function  Description: INTERVENTIONS:  - Assess bowel function  - Encourage oral fluids to ensure adequate hydration  - Administer IV fluids if ordered to ensure adequate hydration  - Administer ordered medications as needed  - Encourage mobilization and activity  - Consider nutritional services referral to assist patient with adequate nutrition and appropriate food choices  Outcome: Progressing  Goal: Maintains adequate nutritional intake  Description: INTERVENTIONS:  - Monitor percentage of each meal consumed  - Identify factors contributing to decreased intake, treat as appropriate  - Assist with meals as needed  -  Monitor I&O, weight, and lab values if indicated  - Obtain nutrition services referral as needed  Outcome: Progressing  Goal: Establish and maintain optimal ostomy function  Description: INTERVENTIONS:  - Assess bowel function  - Encourage oral fluids to ensure adequate hydration  - Administer IV fluids if ordered to ensure adequate hydration   - Administer ordered medications as needed  - Encourage mobilization and activity  - Nutrition services referral to assist patient with appropriate food choices  - Assess stoma site  - Consider wound care consult   Outcome: Progressing  Goal: Oral mucous membranes remain intact  Description: INTERVENTIONS  - Assess oral mucosa and hygiene practices  - Implement preventative oral hygiene regimen  - Implement oral medicated treatments as ordered  - Initiate Nutrition services referral as needed  Outcome: Progressing

## 2025-06-27 NOTE — DISCHARGE SUMMARY
Discharge Summary - Mykel Gonzales 44 y.o. male MRN: [id number]    Unit/Bed#: -01 Encounter: [id number]    Admission Date: 6/20/2025   Discharge Date: 06/27/25    Admitting Diagnosis:   Abdominal pain [R10.9]  Perforated viscus [R19.8]  Perforation of viscus [R19.8]    Discharge Diagnoses: Active Problems:    Pneumoperitoneum    Perforated viscus    Tobacco abuse    Alcohol abuse    Consultations: none    Imaging:     CTAP with IV contrast 6/20/2025 findings:  ABDOMINOPELVIC CAVITY: Ascites seen with fluid in the pelvic cul-de-sac, paracolic gutters. There is pneumoperitoneum with air in the upper abdomen, subdiaphragmatic region. This is also seen near the royer hepatis. Mild thickening of the proximal sigmoid colon  IMPRESSION: Hollow viscus perforation with pneumoperitoneum and ascites  Although the exact site of perforation is difficult to discern, the free air is predominantly distributed in the upper abdomen generally favors perforation in the gastroduodenal area  Mild thickening of the sigmoid colon  Patent mesenteric vasculature     Upper GI 06/23:  -Contrast flows freely from the stomach lumen into the duodenum with no extravasation. Especially no contrast extravasated at the pyloric ulcer patch.      Procedures Performed: Exploratory laparotomy. Omar patch repair of perforated ulcer of pylorus 6/20/2025    HPI: (obtained from admission H&P)    Mykel Gonzales is a 44 y.o. year old [place]-speaking male with no significant PMHx who presents to the ED sudden onset severe generalized abdominal pain since this morning.  He has associated multiple episodes of vomiting.  He was eating and drinking without difficulty throughout yesterday.  Denies any diarrhea, constipation, hematochezia, melena, history of GI problems, history of similar pain in the past.  Denies any fever, urinary complaints, dizziness ,syncope, SOB or CP.  He has had no surgery in the past.  While in the ED, he was found on exam  to have a rigid abdomen with diffuse tenderness and guarding.  Vital signs were stable and within normal limits.  Lab studies showed leukocytosis and were otherwise unremarkable.  CT of his abdomen showed ascites and pneumoperitoneum in the upper abdomen, subdiaphragmatic region and near the royer hepatis concerning for perforated abdominal viscus in the gastroduodenal area.     Hospital Course: Mykel Gonzales is a 44 y.o. male who presented 6/20/2025 with an acute surgical abdomen d/t a perforated gastric ulcer. The patient was take to the operating room asap on the day of admission and underwent exploratory laparotomy and Omar patch repair of a perforated ulcer of the pylorus.   Intraoperative findings included: The gallbladder was adhered to the stomach at the site of the perforation by a small adhesion.  Once this adhesion was lysed, the perforation was identified  at the gastric pylorus.  It measured approximately 1 cm.  There was significant inflammation of the area consistent with probable ulcer.  Biopsy was obtained.  There was gastric content contamination of all 4 quadrants of the abdominal cavity.  Infection was present at time of surgery as evidenced by contamination from gastric contents.. The patient's postoperative hospital course was generally uncomplicated.  He received IV Rocephin Flagyl and Protonix.  He remained n.p.o. with IV fluid hydration and NG tube to wall suction until POD 3 when he underwent upper GI study which was negative for any persistent gastric leak.  His NG was removed and he was started on clear liquid diet.  On POD 5, he was advanced to a surgical soft low fiber diet which he tolerated well.  Throughout his postop course he did have some mild persistent leukocytosis.  He also had mild hypomagnesemia and hypokalemia requiring repletion.  POD 6, his ROBYN Comfort was removed.  POD 7, he was deemed stable for discharge home.  He will be following up in the outpatient surgical office  for recheck and staple removal in 1 week.  He was sent home with Cipro, Flagyl and Protonix prescriptions.  Return precautions were reviewed.  He was recommended to continue with a soft low fiber diet and frequent small meals.    Progress and exam on day of discharge:   -Patient states is doing very well this morning. +Tolerating soft surgical diet well without  abdominal pain or nausea. Passing flatus. Urinating without difficulty.  Having soft bowel movements.  Denies n/v/d/c, fever, chills, CP, or SOB.    Persistent mild leukocytosis 12.14 (12.9, 11.8,11.22,12.99, 12.87)  Hgb stable   Hypokalemia 3.3 (3.2, 3.3, 4.5, 3.6, 3.3)  Magnesium 1.7(1.7)  VSS and WNL  Operative peritoneal fluid culture: Growing streptococcus mitis oralis and rothia mucilaginosa anaerobic culture growing actinomyces and Streptococcus parasanguinous.     Plan:   replete K and Mg  D/C home with f/u in surgery office in 1 week  Transition to oral cipro/flagyl to complete 14 day course  Oral protonix 40mg bid    -PE:  General: VSS amd WNL, NAD, alert, pleasant  Head normocephalic, atraumatic  Neck: supple, NT, no masses or JVD   Lungs nl respiratory effort  Heart RRR   Abd soft, no distension, NABSx4, NT except near incision , no masses or guarding, midline surgical incision clean, dry with skin clips intact with no surrounding erythema.  RLQ drain site draining serous fluid onto dressing.  Dressing exchanged.  Extremities: FAROM with good strength equal bilaterally, no edema  Neuro: A&Ox3, affect appropriate, distal sensation and muscular strength intact    Patient was discharged on hospital day7/POD6. On the day of discharge, the patient was voiding spontaneously, tolerating a diet, having Bms, ambulating at baseline, and pain was resolved. The patient was sent home with prescriptions for cipro, flagyl and protonix.  He understood all instructions for discharge. He was given the names and numbers of the providers as well as instructions for  follow up appointments.     Condition at Discharge: Good    Discharge instructions/Information to patient and family:   See after visit summary for information provided to patient and family.      Provisions for Follow-Up Care:  See after-visit summary for information related to follow-up care and any pertinent home health orders.      Disposition: Home    Planned Readmission: No    Discharge Statement   I spent 30 minutes discharging the patient. This time was spent on the day of discharge. I had direct contact with the patient on the day of discharge. Additional documentation is required if more than 30 minutes were spent on discharge.     Discharge Medications:  See after visit summary for reconciled discharge medications provided to patient and family.

## 2025-06-27 NOTE — DISCHARGE INSTR - AVS FIRST PAGE
1.  Keep a gauze dressing over the drain site to collect any drainage. Keep it dry. When it stops draining you may switch to covering it with a bandaid until it's healed.   2.  Apply nothing to incisions.    3.  Eat a low-fiber diet and small meals  4.  Avoid any NSAID pain medications such as ibuprofen or aspirin  5.  No bathtubs or pools.  6.  No strenuous activity for now  7.  No lifting, pushing, and/or pulling greater than 5 pounds until further notice.  8.  Call Dr. Tate's office if you have any questions or concerns prior to your postoperative appointment.

## (undated) DEVICE — ANTIBACTERIAL UNDYED BRAIDED (POLYGLACTIN 910), SYNTHETIC ABSORBABLE SUTURE: Brand: COATED VICRYL

## (undated) DEVICE — Device

## (undated) DEVICE — TRAY FOLEY 16FR URIMETER SURESTEP

## (undated) DEVICE — 3M™ STERI-STRIP™ COMPOUND BENZOIN TINCTURE 40 BAGS/CARTON 4 CARTONS/CASE C1544: Brand: 3M™ STERI-STRIP™

## (undated) DEVICE — TELFA NON-ADHERENT ABSORBENT DRESSING: Brand: TELFA

## (undated) DEVICE — NEEDLE COUNTER, 1820 FOAM BLOCK: Brand: DEVON

## (undated) DEVICE — INTENDED FOR TISSUE SEPARATION, AND OTHER PROCEDURES THAT REQUIRE A SHARP SURGICAL BLADE TO PUNCTURE OR CUT.: Brand: BARD-PARKER SAFETY BLADES SIZE 10, STERILE

## (undated) DEVICE — 3M™ IOBAN™ 2 ANTIMICROBIAL INCISE DRAPE 6640EZ: Brand: IOBAN™ 2

## (undated) DEVICE — SOLUTION BOWL: Brand: KENDALL

## (undated) DEVICE — ABDOMINAL PAD: Brand: DERMACEA

## (undated) DEVICE — WET SKIN PREP TRAY: Brand: MEDLINE INDUSTRIES, INC.

## (undated) DEVICE — DRAPE TOWEL: Brand: CONVERTORS

## (undated) DEVICE — IMPERVIOUS SURGICAL GOWN, LG: Brand: CONVERTORS

## (undated) DEVICE — POOLE SUCTION HANDLE: Brand: CARDINAL HEALTH

## (undated) DEVICE — LAPAROTOMY SPONGE - RF AND X-RAY DETECTABLE PRE-WASHED: Brand: SITUATE

## (undated) DEVICE — MEDI-VAC YANK SUCT HNDL W/TPRD BULBOUS TIP: Brand: CARDINAL HEALTH

## (undated) DEVICE — JP CHANNEL DRAIN, 19FR HUBLESS: Brand: CARDINAL HEALTH

## (undated) DEVICE — GAUZE SPONGES,16 PLY: Brand: CURITY

## (undated) DEVICE — SUT SILK 2-0 SH 30 IN K833H

## (undated) DEVICE — CURVED, LARGE JAW, OPEN SEALER/DIVIDER NANO-COATED: Brand: LIGASURE IMPACT

## (undated) DEVICE — STANDARD SURGICAL GOWN, L: Brand: CONVERTORS

## (undated) DEVICE — DRAPE SHEET THREE QUARTER

## (undated) DEVICE — PENCILETTE SMOKE EVAC PUSH BUTTON COATED

## (undated) DEVICE — SLIM BODY SKIN STAPLER: Brand: APPOSE ULC

## (undated) DEVICE — CAUTERY TIP POLISHER: Brand: DEVON

## (undated) DEVICE — 4-PORT MANIFOLD: Brand: NEPTUNE 2

## (undated) DEVICE — BETHLEHEM MAJOR GENERAL PACK: Brand: CARDINAL HEALTH

## (undated) DEVICE — BULB SYRINGE, IRRIGATION WITH PROTECTIVE CAP, 60 CC, INDIVIDUALLY WRAPPED: Brand: DOVER

## (undated) DEVICE — 3M™ TEGADERM™ TRANSPARENT FILM DRESSING FRAME STYLE, 1624W, 2-3/8 IN X 2-3/4 IN (6 CM X 7 CM), 100/CT 4CT/CASE: Brand: 3M™ TEGADERM™

## (undated) DEVICE — REM POLYHESIVE ADULT PATIENT RETURN ELECTRODE: Brand: VALLEYLAB

## (undated) DEVICE — JACKSON-PRATT 100CC BULB RESERVOIR: Brand: CARDINAL HEALTH